# Patient Record
Sex: FEMALE | Race: WHITE | HISPANIC OR LATINO
[De-identification: names, ages, dates, MRNs, and addresses within clinical notes are randomized per-mention and may not be internally consistent; named-entity substitution may affect disease eponyms.]

---

## 2017-01-30 ENCOUNTER — APPOINTMENT (OUTPATIENT)
Dept: SURGICAL ONCOLOGY | Facility: CLINIC | Age: 72
End: 2017-01-30

## 2017-02-06 ENCOUNTER — LABORATORY RESULT (OUTPATIENT)
Age: 72
End: 2017-02-06

## 2017-02-06 ENCOUNTER — APPOINTMENT (OUTPATIENT)
Dept: RHEUMATOLOGY | Facility: CLINIC | Age: 72
End: 2017-02-06

## 2017-02-06 VITALS
HEIGHT: 62 IN | SYSTOLIC BLOOD PRESSURE: 116 MMHG | OXYGEN SATURATION: 98 % | TEMPERATURE: 98.5 F | BODY MASS INDEX: 22.45 KG/M2 | HEART RATE: 60 BPM | DIASTOLIC BLOOD PRESSURE: 76 MMHG | WEIGHT: 122 LBS

## 2017-02-06 DIAGNOSIS — E55.9 VITAMIN D DEFICIENCY, UNSPECIFIED: ICD-10-CM

## 2017-02-07 LAB
25(OH)D3 SERPL-MCNC: 23.6 NG/ML
ALBUMIN SERPL ELPH-MCNC: 4.5 G/DL
ALP BLD-CCNC: 71 U/L
ALT SERPL-CCNC: 15 U/L
ANION GAP SERPL CALC-SCNC: 12 MMOL/L
AST SERPL-CCNC: 16 U/L
BASOPHILS # BLD AUTO: 0.02 K/UL
BASOPHILS NFR BLD AUTO: 0.3 %
BILIRUB SERPL-MCNC: 0.8 MG/DL
BUN SERPL-MCNC: 11 MG/DL
CALCIUM SERPL-MCNC: 9.2 MG/DL
CALCIUM SERPL-MCNC: 9.2 MG/DL
CHLORIDE SERPL-SCNC: 103 MMOL/L
CO2 SERPL-SCNC: 25 MMOL/L
CREAT SERPL-MCNC: 0.77 MG/DL
CRP SERPL-MCNC: 0.33 MG/DL
EOSINOPHIL # BLD AUTO: 0.23 K/UL
EOSINOPHIL NFR BLD AUTO: 2.9 %
ERYTHROCYTE [SEDIMENTATION RATE] IN BLOOD BY WESTERGREN METHOD: 4 MM/HR
GLUCOSE SERPL-MCNC: 91 MG/DL
HCT VFR BLD CALC: 41.7 %
HGB BLD-MCNC: 12.9 G/DL
IMM GRANULOCYTES NFR BLD AUTO: 0.3 %
LYMPHOCYTES # BLD AUTO: 3.01 K/UL
LYMPHOCYTES NFR BLD AUTO: 37.7 %
MAGNESIUM SERPL-MCNC: 2.2 MG/DL
MAN DIFF?: NORMAL
MCHC RBC-ENTMCNC: 26.3 PG
MCHC RBC-ENTMCNC: 30.9 GM/DL
MCV RBC AUTO: 85.1 FL
MONOCYTES # BLD AUTO: 0.47 K/UL
MONOCYTES NFR BLD AUTO: 5.9 %
NEUTROPHILS # BLD AUTO: 4.24 K/UL
NEUTROPHILS NFR BLD AUTO: 52.9 %
PARATHYROID HORMONE INTACT: 54 PG/ML
PHOSPHATE SERPL-MCNC: 3.1 MG/DL
PLATELET # BLD AUTO: 277 K/UL
POTASSIUM SERPL-SCNC: 4.6 MMOL/L
PROT SERPL-MCNC: 6.9 G/DL
RBC # BLD: 4.9 M/UL
RBC # FLD: 14.9 %
SODIUM SERPL-SCNC: 140 MMOL/L
TSH SERPL-ACNC: 4.95 UIU/ML
WBC # FLD AUTO: 7.99 K/UL

## 2017-02-14 ENCOUNTER — APPOINTMENT (OUTPATIENT)
Dept: DERMATOLOGY | Facility: CLINIC | Age: 72
End: 2017-02-14

## 2017-02-14 VITALS — SYSTOLIC BLOOD PRESSURE: 138 MMHG | DIASTOLIC BLOOD PRESSURE: 78 MMHG

## 2017-02-14 DIAGNOSIS — L82.1 OTHER SEBORRHEIC KERATOSIS: ICD-10-CM

## 2017-02-14 DIAGNOSIS — L30.9 DERMATITIS, UNSPECIFIED: ICD-10-CM

## 2017-02-27 ENCOUNTER — APPOINTMENT (OUTPATIENT)
Dept: RHEUMATOLOGY | Facility: CLINIC | Age: 72
End: 2017-02-27

## 2017-02-27 ENCOUNTER — LABORATORY RESULT (OUTPATIENT)
Age: 72
End: 2017-02-27

## 2017-02-27 VITALS
SYSTOLIC BLOOD PRESSURE: 155 MMHG | BODY MASS INDEX: 23 KG/M2 | TEMPERATURE: 98.2 F | WEIGHT: 125 LBS | HEIGHT: 62 IN | DIASTOLIC BLOOD PRESSURE: 78 MMHG | OXYGEN SATURATION: 98 %

## 2017-02-27 VITALS — HEART RATE: 65 BPM

## 2017-02-27 RX ORDER — DENOSUMAB 60 MG/ML
60 INJECTION SUBCUTANEOUS
Qty: 1 | Refills: 0 | Status: COMPLETED | OUTPATIENT
Start: 2017-02-27

## 2017-02-27 RX ADMIN — DENOSUMAB 0 MG/ML: 60 INJECTION SUBCUTANEOUS at 00:00

## 2017-02-28 LAB
T3FREE SERPL-MCNC: 2.14 PG/ML
TSH SERPL-ACNC: 6.06 UIU/ML

## 2017-03-01 ENCOUNTER — OTHER (OUTPATIENT)
Age: 72
End: 2017-03-01

## 2017-03-01 ENCOUNTER — APPOINTMENT (OUTPATIENT)
Dept: ORTHOPEDIC SURGERY | Facility: CLINIC | Age: 72
End: 2017-03-01

## 2017-03-01 VITALS
HEART RATE: 52 BPM | SYSTOLIC BLOOD PRESSURE: 144 MMHG | HEIGHT: 61 IN | DIASTOLIC BLOOD PRESSURE: 79 MMHG | WEIGHT: 119 LBS | BODY MASS INDEX: 22.47 KG/M2

## 2017-03-22 ENCOUNTER — APPOINTMENT (OUTPATIENT)
Dept: SURGICAL ONCOLOGY | Facility: CLINIC | Age: 72
End: 2017-03-22

## 2017-03-22 VITALS
WEIGHT: 120 LBS | SYSTOLIC BLOOD PRESSURE: 153 MMHG | HEART RATE: 63 BPM | HEIGHT: 61 IN | BODY MASS INDEX: 22.66 KG/M2 | DIASTOLIC BLOOD PRESSURE: 81 MMHG | RESPIRATION RATE: 14 BRPM

## 2017-03-22 DIAGNOSIS — Z97.3 PRESENCE OF SPECTACLES AND CONTACT LENSES: ICD-10-CM

## 2017-03-27 ENCOUNTER — INPATIENT (INPATIENT)
Facility: HOSPITAL | Age: 72
LOS: 1 days | Discharge: HOME | End: 2017-03-29
Attending: SPECIALIST | Admitting: SPECIALIST
Payer: MEDICARE

## 2017-03-27 VITALS
DIASTOLIC BLOOD PRESSURE: 76 MMHG | OXYGEN SATURATION: 100 % | SYSTOLIC BLOOD PRESSURE: 160 MMHG | TEMPERATURE: 98 F | HEART RATE: 81 BPM | RESPIRATION RATE: 16 BRPM

## 2017-03-27 DIAGNOSIS — K55.9 VASCULAR DISORDER OF INTESTINE, UNSPECIFIED: ICD-10-CM

## 2017-03-27 DIAGNOSIS — Z90.3 ACQUIRED ABSENCE OF STOMACH [PART OF]: Chronic | ICD-10-CM

## 2017-03-27 DIAGNOSIS — Z98.89 OTHER SPECIFIED POSTPROCEDURAL STATES: Chronic | ICD-10-CM

## 2017-03-27 LAB
ALBUMIN SERPL ELPH-MCNC: 3.6 G/DL — SIGNIFICANT CHANGE UP (ref 3.3–5)
ALBUMIN SERPL ELPH-MCNC: 4.1 G/DL — SIGNIFICANT CHANGE UP (ref 3.3–5)
ALP SERPL-CCNC: 78 U/L — SIGNIFICANT CHANGE UP (ref 40–120)
ALP SERPL-CCNC: 91 U/L — SIGNIFICANT CHANGE UP (ref 40–120)
ALT FLD-CCNC: 12 U/L — SIGNIFICANT CHANGE UP (ref 4–33)
ALT FLD-CCNC: 16 U/L — SIGNIFICANT CHANGE UP (ref 4–33)
APTT BLD: 36.1 SEC — SIGNIFICANT CHANGE UP (ref 27.5–37.4)
AST SERPL-CCNC: 14 U/L — SIGNIFICANT CHANGE UP (ref 4–32)
AST SERPL-CCNC: 27 U/L — SIGNIFICANT CHANGE UP (ref 4–32)
BASE EXCESS BLDV CALC-SCNC: -0.2 MMOL/L — SIGNIFICANT CHANGE UP
BASOPHILS # BLD AUTO: 0.02 K/UL — SIGNIFICANT CHANGE UP (ref 0–0.2)
BASOPHILS NFR BLD AUTO: 0.2 % — SIGNIFICANT CHANGE UP (ref 0–2)
BILIRUB SERPL-MCNC: 0.8 MG/DL — SIGNIFICANT CHANGE UP (ref 0.2–1.2)
BILIRUB SERPL-MCNC: 1.2 MG/DL — SIGNIFICANT CHANGE UP (ref 0.2–1.2)
BLD GP AB SCN SERPL QL: NEGATIVE — SIGNIFICANT CHANGE UP
BLOOD GAS VENOUS - CREATININE: 0.61 MG/DL — SIGNIFICANT CHANGE UP (ref 0.5–1.3)
BUN SERPL-MCNC: 4 MG/DL — LOW (ref 7–23)
BUN SERPL-MCNC: 7 MG/DL — SIGNIFICANT CHANGE UP (ref 7–23)
CALCIUM SERPL-MCNC: 8.5 MG/DL — SIGNIFICANT CHANGE UP (ref 8.4–10.5)
CALCIUM SERPL-MCNC: 8.9 MG/DL — SIGNIFICANT CHANGE UP (ref 8.4–10.5)
CHLORIDE BLDV-SCNC: 109 MMOL/L — HIGH (ref 96–108)
CHLORIDE SERPL-SCNC: 100 MMOL/L — SIGNIFICANT CHANGE UP (ref 98–107)
CHLORIDE SERPL-SCNC: 110 MMOL/L — HIGH (ref 98–107)
CO2 SERPL-SCNC: 18 MMOL/L — LOW (ref 22–31)
CO2 SERPL-SCNC: 23 MMOL/L — SIGNIFICANT CHANGE UP (ref 22–31)
CREAT SERPL-MCNC: 0.6 MG/DL — SIGNIFICANT CHANGE UP (ref 0.5–1.3)
CREAT SERPL-MCNC: 0.66 MG/DL — SIGNIFICANT CHANGE UP (ref 0.5–1.3)
EOSINOPHIL # BLD AUTO: 0.05 K/UL — SIGNIFICANT CHANGE UP (ref 0–0.5)
EOSINOPHIL NFR BLD AUTO: 0.4 % — SIGNIFICANT CHANGE UP (ref 0–6)
GAS PNL BLDV: 136 MMOL/L — SIGNIFICANT CHANGE UP (ref 136–146)
GLUCOSE BLDV-MCNC: 95 — SIGNIFICANT CHANGE UP (ref 70–99)
GLUCOSE SERPL-MCNC: 110 MG/DL — HIGH (ref 70–99)
GLUCOSE SERPL-MCNC: 97 MG/DL — SIGNIFICANT CHANGE UP (ref 70–99)
HCO3 BLDV-SCNC: 23 MMOL/L — SIGNIFICANT CHANGE UP (ref 20–27)
HCT VFR BLD CALC: 34.2 % — LOW (ref 34.5–45)
HCT VFR BLD CALC: 38.2 % — SIGNIFICANT CHANGE UP (ref 34.5–45)
HCT VFR BLDV CALC: 35.3 % — SIGNIFICANT CHANGE UP (ref 34.5–45)
HGB BLD-MCNC: 11.5 G/DL — SIGNIFICANT CHANGE UP (ref 11.5–15.5)
HGB BLD-MCNC: 12.4 G/DL — SIGNIFICANT CHANGE UP (ref 11.5–15.5)
HGB BLDV-MCNC: 11.5 G/DL — SIGNIFICANT CHANGE UP (ref 11.5–15.5)
IMM GRANULOCYTES NFR BLD AUTO: 0.3 % — SIGNIFICANT CHANGE UP (ref 0–1.5)
INR BLD: 1.03 — SIGNIFICANT CHANGE UP (ref 0.88–1.17)
LACTATE BLDV-MCNC: 2.3 MMOL/L — HIGH (ref 0.5–2)
LACTATE SERPL-SCNC: 1.3 MMOL/L — SIGNIFICANT CHANGE UP (ref 0.5–2)
LIDOCAIN IGE QN: 31.2 U/L — SIGNIFICANT CHANGE UP (ref 7–60)
LYMPHOCYTES # BLD AUTO: 1.87 K/UL — SIGNIFICANT CHANGE UP (ref 1–3.3)
LYMPHOCYTES # BLD AUTO: 16.5 % — SIGNIFICANT CHANGE UP (ref 13–44)
MCHC RBC-ENTMCNC: 26.5 PG — LOW (ref 27–34)
MCHC RBC-ENTMCNC: 27.3 PG — SIGNIFICANT CHANGE UP (ref 27–34)
MCHC RBC-ENTMCNC: 32.5 % — SIGNIFICANT CHANGE UP (ref 32–36)
MCHC RBC-ENTMCNC: 33.6 % — SIGNIFICANT CHANGE UP (ref 32–36)
MCV RBC AUTO: 81.2 FL — SIGNIFICANT CHANGE UP (ref 80–100)
MCV RBC AUTO: 81.6 FL — SIGNIFICANT CHANGE UP (ref 80–100)
MONOCYTES # BLD AUTO: 0.57 K/UL — SIGNIFICANT CHANGE UP (ref 0–0.9)
MONOCYTES NFR BLD AUTO: 5 % — SIGNIFICANT CHANGE UP (ref 2–14)
NEUTROPHILS # BLD AUTO: 8.78 K/UL — HIGH (ref 1.8–7.4)
NEUTROPHILS NFR BLD AUTO: 77.6 % — HIGH (ref 43–77)
PCO2 BLDV: 43 MMHG — SIGNIFICANT CHANGE UP (ref 41–51)
PH BLDV: 7.37 PH — SIGNIFICANT CHANGE UP (ref 7.32–7.43)
PLATELET # BLD AUTO: 231 K/UL — SIGNIFICANT CHANGE UP (ref 150–400)
PLATELET # BLD AUTO: 246 K/UL — SIGNIFICANT CHANGE UP (ref 150–400)
PMV BLD: 10.6 FL — SIGNIFICANT CHANGE UP (ref 7–13)
PMV BLD: 10.8 FL — SIGNIFICANT CHANGE UP (ref 7–13)
PO2 BLDV: 28 MMHG — LOW (ref 35–40)
POTASSIUM BLDV-SCNC: 4.1 MMOL/L — SIGNIFICANT CHANGE UP (ref 3.4–4.5)
POTASSIUM SERPL-MCNC: 3.8 MMOL/L — SIGNIFICANT CHANGE UP (ref 3.5–5.3)
POTASSIUM SERPL-MCNC: 4.6 MMOL/L — SIGNIFICANT CHANGE UP (ref 3.5–5.3)
POTASSIUM SERPL-SCNC: 3.8 MMOL/L — SIGNIFICANT CHANGE UP (ref 3.5–5.3)
POTASSIUM SERPL-SCNC: 4.6 MMOL/L — SIGNIFICANT CHANGE UP (ref 3.5–5.3)
PROT SERPL-MCNC: 6 G/DL — SIGNIFICANT CHANGE UP (ref 6–8.3)
PROT SERPL-MCNC: 7 G/DL — SIGNIFICANT CHANGE UP (ref 6–8.3)
PROTHROM AB SERPL-ACNC: 11.6 SEC — SIGNIFICANT CHANGE UP (ref 9.8–13.1)
RBC # BLD: 4.21 M/UL — SIGNIFICANT CHANGE UP (ref 3.8–5.2)
RBC # BLD: 4.68 M/UL — SIGNIFICANT CHANGE UP (ref 3.8–5.2)
RBC # FLD: 14.3 % — SIGNIFICANT CHANGE UP (ref 10.3–14.5)
RBC # FLD: 14.3 % — SIGNIFICANT CHANGE UP (ref 10.3–14.5)
RH IG SCN BLD-IMP: NEGATIVE — SIGNIFICANT CHANGE UP
RH IG SCN BLD-IMP: NEGATIVE — SIGNIFICANT CHANGE UP
SAO2 % BLDV: 53 % — LOW (ref 60–85)
SODIUM SERPL-SCNC: 139 MMOL/L — SIGNIFICANT CHANGE UP (ref 135–145)
SODIUM SERPL-SCNC: 147 MMOL/L — HIGH (ref 135–145)
WBC # BLD: 10.19 K/UL — SIGNIFICANT CHANGE UP (ref 3.8–10.5)
WBC # BLD: 11.32 K/UL — HIGH (ref 3.8–10.5)
WBC # FLD AUTO: 10.19 K/UL — SIGNIFICANT CHANGE UP (ref 3.8–10.5)
WBC # FLD AUTO: 11.32 K/UL — HIGH (ref 3.8–10.5)

## 2017-03-27 PROCEDURE — 74177 CT ABD & PELVIS W/CONTRAST: CPT | Mod: 26

## 2017-03-27 RX ORDER — CIPROFLOXACIN LACTATE 400MG/40ML
400 VIAL (ML) INTRAVENOUS ONCE
Qty: 0 | Refills: 0 | Status: COMPLETED | OUTPATIENT
Start: 2017-03-27 | End: 2017-03-27

## 2017-03-27 RX ORDER — SODIUM CHLORIDE 9 MG/ML
1000 INJECTION, SOLUTION INTRAVENOUS ONCE
Qty: 0 | Refills: 0 | Status: COMPLETED | OUTPATIENT
Start: 2017-03-27 | End: 2017-03-27

## 2017-03-27 RX ORDER — ONDANSETRON 8 MG/1
4 TABLET, FILM COATED ORAL ONCE
Qty: 0 | Refills: 0 | Status: COMPLETED | OUTPATIENT
Start: 2017-03-27 | End: 2017-03-27

## 2017-03-27 RX ORDER — METRONIDAZOLE 500 MG
500 TABLET ORAL ONCE
Qty: 0 | Refills: 0 | Status: COMPLETED | OUTPATIENT
Start: 2017-03-27 | End: 2017-03-27

## 2017-03-27 RX ORDER — ENOXAPARIN SODIUM 100 MG/ML
40 INJECTION SUBCUTANEOUS EVERY 24 HOURS
Qty: 0 | Refills: 0 | Status: DISCONTINUED | OUTPATIENT
Start: 2017-03-27 | End: 2017-03-29

## 2017-03-27 RX ORDER — SODIUM CHLORIDE 9 MG/ML
3 INJECTION INTRAMUSCULAR; INTRAVENOUS; SUBCUTANEOUS
Qty: 0 | Refills: 0 | Status: COMPLETED | OUTPATIENT
Start: 2017-03-27 | End: 2017-03-27

## 2017-03-27 RX ORDER — SODIUM CHLORIDE 9 MG/ML
1000 INJECTION, SOLUTION INTRAVENOUS
Qty: 0 | Refills: 0 | Status: DISCONTINUED | OUTPATIENT
Start: 2017-03-27 | End: 2017-03-28

## 2017-03-27 RX ORDER — PANTOPRAZOLE SODIUM 20 MG/1
40 TABLET, DELAYED RELEASE ORAL DAILY
Qty: 0 | Refills: 0 | Status: DISCONTINUED | OUTPATIENT
Start: 2017-03-27 | End: 2017-03-29

## 2017-03-27 RX ORDER — CIPROFLOXACIN LACTATE 400MG/40ML
400 VIAL (ML) INTRAVENOUS EVERY 12 HOURS
Qty: 0 | Refills: 0 | Status: DISCONTINUED | OUTPATIENT
Start: 2017-03-27 | End: 2017-03-29

## 2017-03-27 RX ORDER — METRONIDAZOLE 500 MG
TABLET ORAL
Qty: 0 | Refills: 0 | Status: DISCONTINUED | OUTPATIENT
Start: 2017-03-27 | End: 2017-03-29

## 2017-03-27 RX ORDER — METOPROLOL TARTRATE 50 MG
5 TABLET ORAL EVERY 6 HOURS
Qty: 0 | Refills: 0 | Status: DISCONTINUED | OUTPATIENT
Start: 2017-03-27 | End: 2017-03-28

## 2017-03-27 RX ORDER — METRONIDAZOLE 500 MG
500 TABLET ORAL EVERY 8 HOURS
Qty: 0 | Refills: 0 | Status: DISCONTINUED | OUTPATIENT
Start: 2017-03-27 | End: 2017-03-29

## 2017-03-27 RX ORDER — SODIUM CHLORIDE 9 MG/ML
1000 INJECTION INTRAMUSCULAR; INTRAVENOUS; SUBCUTANEOUS ONCE
Qty: 0 | Refills: 0 | Status: COMPLETED | OUTPATIENT
Start: 2017-03-27 | End: 2017-03-27

## 2017-03-27 RX ORDER — CIPROFLOXACIN LACTATE 400MG/40ML
VIAL (ML) INTRAVENOUS
Qty: 0 | Refills: 0 | Status: DISCONTINUED | OUTPATIENT
Start: 2017-03-27 | End: 2017-03-29

## 2017-03-27 RX ADMIN — Medication 5 MILLIGRAM(S): at 12:35

## 2017-03-27 RX ADMIN — SODIUM CHLORIDE 1000 MILLILITER(S): 9 INJECTION, SOLUTION INTRAVENOUS at 12:00

## 2017-03-27 RX ADMIN — SODIUM CHLORIDE 150 MILLILITER(S): 9 INJECTION, SOLUTION INTRAVENOUS at 23:16

## 2017-03-27 RX ADMIN — SODIUM CHLORIDE 3 MILLILITER(S): 9 INJECTION INTRAMUSCULAR; INTRAVENOUS; SUBCUTANEOUS at 07:08

## 2017-03-27 RX ADMIN — Medication 5 MILLIGRAM(S): at 23:13

## 2017-03-27 RX ADMIN — ENOXAPARIN SODIUM 40 MILLIGRAM(S): 100 INJECTION SUBCUTANEOUS at 12:36

## 2017-03-27 RX ADMIN — Medication 100 MILLIGRAM(S): at 13:03

## 2017-03-27 RX ADMIN — ONDANSETRON 4 MILLIGRAM(S): 8 TABLET, FILM COATED ORAL at 07:08

## 2017-03-27 RX ADMIN — SODIUM CHLORIDE 1000 MILLILITER(S): 9 INJECTION INTRAMUSCULAR; INTRAVENOUS; SUBCUTANEOUS at 07:09

## 2017-03-27 RX ADMIN — Medication 200 MILLIGRAM(S): at 12:08

## 2017-03-27 RX ADMIN — PANTOPRAZOLE SODIUM 40 MILLIGRAM(S): 20 TABLET, DELAYED RELEASE ORAL at 12:36

## 2017-03-27 RX ADMIN — SODIUM CHLORIDE 150 MILLILITER(S): 9 INJECTION, SOLUTION INTRAVENOUS at 13:31

## 2017-03-27 RX ADMIN — Medication 100 MILLIGRAM(S): at 22:39

## 2017-03-27 NOTE — ED ADULT NURSE NOTE - CAS EDN DISCHARGE ASSESSMENT
No adverse reaction to first time med in ED/Awake/Symptoms improved/Patient baseline mental status/Alert and oriented to person, place and time

## 2017-03-27 NOTE — H&P ADULT. - ASSESSMENT
72yo F with ischemic colitis of descending colon, likely low flow.   - NPO  - IVF hydration  - Cipro/flagyl  - serial abdominal exams  - D/W Dr. Mcclelland

## 2017-03-27 NOTE — ED ADULT NURSE NOTE - OBJECTIVE STATEMENT
HTN, GIST tumor s/p partial gastrectomy, hx of diverticulitis p/w 4 days of 4/10 dull, aching left lower quadrant abdominal pain, non-radiating, worse after eating and with palpation, associated w/ diarrhea and nausea. Had similar pain in the past w/ diverticulitis. Noted some red blood in stool last night. No melena. No upper abdominal pain. calm, cooperative, ambulatory, son at bedside.

## 2017-03-27 NOTE — ED PROVIDER NOTE - OBJECTIVE STATEMENT
71yof w/ HTN, GIST tumor s/p partial gastrectomy, hx of diverticulitis p/w 4 days of 4/10 dull, aching left lower quadrant abdominal pain, non-radiating, worse after eating and with palpation, associated w/ diarrhea and nausea. Had similar pain in the past w/ diverticulitis. Noted some red blood in stool last night. No melena. No upper abdominal pain. Chills, no fevers. No dysuria, vaginal bleeding, pelvic pain.

## 2017-03-27 NOTE — ED ADULT NURSE REASSESSMENT NOTE - NS ED NURSE REASSESS COMMENT FT1
floor RN on [id number] is in shift report, would not take ED report.  report given to ED incoming night nurse

## 2017-03-27 NOTE — ED ADULT TRIAGE NOTE - CHIEF COMPLAINT QUOTE
Patient c/o lower left sided abdominal pain since last Thursday. Last night the pain was so bad she almost passed out, had diarrhea and vomiting, small amount of blood in her stool.  H/O diverticulitis , diagnosed last year.

## 2017-03-27 NOTE — ED PROVIDER NOTE - PROGRESS NOTE DETAILS
MANI Orta - pt signed out to me by Dr aHthaway at the shift change, abd pain, hx of diverticulitis, CT abd/pelvis pending. Pt non-toxic appearing, afebrile, doesn't require pain meds at the moment. will continue to monitor.

## 2017-03-27 NOTE — ED PROVIDER NOTE - MEDICAL DECISION MAKING DETAILS
71yof w/ LLQ pain, diarrhea, consistent w/ diverticulitis, however given hx of GIST tumor and advanced age will CT A/P to r/o other causes of abdominal pain. Check basic labs, fluids, analgesia.

## 2017-03-27 NOTE — ED PROVIDER NOTE - ATTENDING CONTRIBUTION TO CARE
pt with LLQ abd pain.  Reports a history of divertic in the past which turned out to be gastric CA.  As such with a tender abd will get CT scan.  Concern for divertic, complication of surgery, or other abd pathology

## 2017-03-28 LAB
APPEARANCE UR: CLEAR — SIGNIFICANT CHANGE UP
BACTERIA # UR AUTO: SIGNIFICANT CHANGE UP
BILIRUB UR-MCNC: NEGATIVE — SIGNIFICANT CHANGE UP
BLOOD UR QL VISUAL: HIGH
BUN SERPL-MCNC: 3 MG/DL — LOW (ref 7–23)
CALCIUM SERPL-MCNC: 8.7 MG/DL — SIGNIFICANT CHANGE UP (ref 8.4–10.5)
CHLORIDE SERPL-SCNC: 106 MMOL/L — SIGNIFICANT CHANGE UP (ref 98–107)
CO2 SERPL-SCNC: 25 MMOL/L — SIGNIFICANT CHANGE UP (ref 22–31)
COLOR SPEC: COLORLESS — SIGNIFICANT CHANGE UP
CREAT SERPL-MCNC: 0.6 MG/DL — SIGNIFICANT CHANGE UP (ref 0.5–1.3)
GLUCOSE SERPL-MCNC: 104 MG/DL — HIGH (ref 70–99)
GLUCOSE UR-MCNC: NEGATIVE — SIGNIFICANT CHANGE UP
HCT VFR BLD CALC: 34 % — LOW (ref 34.5–45)
HGB BLD-MCNC: 11.2 G/DL — LOW (ref 11.5–15.5)
HYALINE CASTS # UR AUTO: SIGNIFICANT CHANGE UP (ref 0–?)
KETONES UR-MCNC: SIGNIFICANT CHANGE UP
LEUKOCYTE ESTERASE UR-ACNC: HIGH
MCHC RBC-ENTMCNC: 26.5 PG — LOW (ref 27–34)
MCHC RBC-ENTMCNC: 32.9 % — SIGNIFICANT CHANGE UP (ref 32–36)
MCV RBC AUTO: 80.4 FL — SIGNIFICANT CHANGE UP (ref 80–100)
MUCOUS THREADS # UR AUTO: SIGNIFICANT CHANGE UP
NITRITE UR-MCNC: NEGATIVE — SIGNIFICANT CHANGE UP
PH UR: 7.5 — SIGNIFICANT CHANGE UP (ref 4.6–8)
PLATELET # BLD AUTO: 236 K/UL — SIGNIFICANT CHANGE UP (ref 150–400)
PMV BLD: 10.5 FL — SIGNIFICANT CHANGE UP (ref 7–13)
POTASSIUM SERPL-MCNC: 3.9 MMOL/L — SIGNIFICANT CHANGE UP (ref 3.5–5.3)
POTASSIUM SERPL-SCNC: 3.9 MMOL/L — SIGNIFICANT CHANGE UP (ref 3.5–5.3)
PROT UR-MCNC: NEGATIVE — SIGNIFICANT CHANGE UP
RBC # BLD: 4.23 M/UL — SIGNIFICANT CHANGE UP (ref 3.8–5.2)
RBC # FLD: 14.3 % — SIGNIFICANT CHANGE UP (ref 10.3–14.5)
RBC CASTS # UR COMP ASSIST: SIGNIFICANT CHANGE UP (ref 0–?)
SODIUM SERPL-SCNC: 143 MMOL/L — SIGNIFICANT CHANGE UP (ref 135–145)
SP GR SPEC: 1.01 — SIGNIFICANT CHANGE UP (ref 1–1.03)
SQUAMOUS # UR AUTO: SIGNIFICANT CHANGE UP
UROBILINOGEN FLD QL: NORMAL E.U. — SIGNIFICANT CHANGE UP (ref 0.1–0.2)
WBC # BLD: 9.69 K/UL — SIGNIFICANT CHANGE UP (ref 3.8–10.5)
WBC # FLD AUTO: 9.69 K/UL — SIGNIFICANT CHANGE UP (ref 3.8–10.5)
WBC UR QL: SIGNIFICANT CHANGE UP (ref 0–?)

## 2017-03-28 PROCEDURE — 99232 SBSQ HOSP IP/OBS MODERATE 35: CPT

## 2017-03-28 RX ORDER — ACETAMINOPHEN 500 MG
650 TABLET ORAL ONCE
Qty: 0 | Refills: 0 | Status: COMPLETED | OUTPATIENT
Start: 2017-03-28 | End: 2017-03-28

## 2017-03-28 RX ORDER — DEXTROSE MONOHYDRATE, SODIUM CHLORIDE, AND POTASSIUM CHLORIDE 50; .745; 4.5 G/1000ML; G/1000ML; G/1000ML
1000 INJECTION, SOLUTION INTRAVENOUS
Qty: 0 | Refills: 0 | Status: DISCONTINUED | OUTPATIENT
Start: 2017-03-28 | End: 2017-03-29

## 2017-03-28 RX ORDER — LISINOPRIL 2.5 MG/1
10 TABLET ORAL DAILY
Qty: 0 | Refills: 0 | Status: DISCONTINUED | OUTPATIENT
Start: 2017-03-28 | End: 2017-03-29

## 2017-03-28 RX ORDER — METOPROLOL TARTRATE 50 MG
25 TABLET ORAL
Qty: 0 | Refills: 0 | Status: DISCONTINUED | OUTPATIENT
Start: 2017-03-28 | End: 2017-03-29

## 2017-03-28 RX ADMIN — SODIUM CHLORIDE 50 MILLILITER(S): 9 INJECTION, SOLUTION INTRAVENOUS at 10:45

## 2017-03-28 RX ADMIN — SODIUM CHLORIDE 150 MILLILITER(S): 9 INJECTION, SOLUTION INTRAVENOUS at 08:22

## 2017-03-28 RX ADMIN — Medication 100 MILLIGRAM(S): at 13:30

## 2017-03-28 RX ADMIN — Medication 100 MILLIGRAM(S): at 21:55

## 2017-03-28 RX ADMIN — Medication 5 MILLIGRAM(S): at 05:54

## 2017-03-28 RX ADMIN — Medication 650 MILLIGRAM(S): at 10:29

## 2017-03-28 RX ADMIN — Medication 200 MILLIGRAM(S): at 07:23

## 2017-03-28 RX ADMIN — Medication 5 MILLIGRAM(S): at 11:32

## 2017-03-28 RX ADMIN — Medication 650 MILLIGRAM(S): at 09:55

## 2017-03-28 RX ADMIN — ENOXAPARIN SODIUM 40 MILLIGRAM(S): 100 INJECTION SUBCUTANEOUS at 11:21

## 2017-03-28 RX ADMIN — Medication 650 MILLIGRAM(S): at 18:54

## 2017-03-28 RX ADMIN — Medication 200 MILLIGRAM(S): at 17:10

## 2017-03-28 RX ADMIN — DEXTROSE MONOHYDRATE, SODIUM CHLORIDE, AND POTASSIUM CHLORIDE 50 MILLILITER(S): 50; .745; 4.5 INJECTION, SOLUTION INTRAVENOUS at 19:52

## 2017-03-28 RX ADMIN — Medication 100 MILLIGRAM(S): at 05:54

## 2017-03-28 RX ADMIN — Medication 650 MILLIGRAM(S): at 18:16

## 2017-03-29 ENCOUNTER — TRANSCRIPTION ENCOUNTER (OUTPATIENT)
Age: 72
End: 2017-03-29

## 2017-03-29 VITALS
HEART RATE: 60 BPM | OXYGEN SATURATION: 100 % | RESPIRATION RATE: 17 BRPM | SYSTOLIC BLOOD PRESSURE: 132 MMHG | TEMPERATURE: 98 F | DIASTOLIC BLOOD PRESSURE: 66 MMHG

## 2017-03-29 LAB
BUN SERPL-MCNC: 3 MG/DL — LOW (ref 7–23)
CALCIUM SERPL-MCNC: 8.8 MG/DL — SIGNIFICANT CHANGE UP (ref 8.4–10.5)
CHLORIDE SERPL-SCNC: 105 MMOL/L — SIGNIFICANT CHANGE UP (ref 98–107)
CO2 SERPL-SCNC: 23 MMOL/L — SIGNIFICANT CHANGE UP (ref 22–31)
CREAT SERPL-MCNC: 0.65 MG/DL — SIGNIFICANT CHANGE UP (ref 0.5–1.3)
GLUCOSE SERPL-MCNC: 103 MG/DL — HIGH (ref 70–99)
MAGNESIUM SERPL-MCNC: 1.9 MG/DL — SIGNIFICANT CHANGE UP (ref 1.6–2.6)
PHOSPHATE SERPL-MCNC: 2.5 MG/DL — SIGNIFICANT CHANGE UP (ref 2.5–4.5)
POTASSIUM SERPL-MCNC: 3.7 MMOL/L — SIGNIFICANT CHANGE UP (ref 3.5–5.3)
POTASSIUM SERPL-SCNC: 3.7 MMOL/L — SIGNIFICANT CHANGE UP (ref 3.5–5.3)
SODIUM SERPL-SCNC: 142 MMOL/L — SIGNIFICANT CHANGE UP (ref 135–145)

## 2017-03-29 PROCEDURE — 99238 HOSP IP/OBS DSCHRG MGMT 30/<: CPT

## 2017-03-29 RX ORDER — METRONIDAZOLE 500 MG
1 TABLET ORAL
Qty: 21 | Refills: 0
Start: 2017-03-29 | End: 2017-04-05

## 2017-03-29 RX ORDER — CIPROFLOXACIN LACTATE 400MG/40ML
1 VIAL (ML) INTRAVENOUS
Qty: 14 | Refills: 0
Start: 2017-03-29 | End: 2017-04-05

## 2017-03-29 RX ORDER — MAGNESIUM SULFATE 500 MG/ML
1 VIAL (ML) INJECTION ONCE
Qty: 0 | Refills: 0 | Status: COMPLETED | OUTPATIENT
Start: 2017-03-29 | End: 2017-03-29

## 2017-03-29 RX ORDER — POTASSIUM CHLORIDE 20 MEQ
20 PACKET (EA) ORAL ONCE
Qty: 0 | Refills: 0 | Status: COMPLETED | OUTPATIENT
Start: 2017-03-29 | End: 2017-03-29

## 2017-03-29 RX ORDER — ASPIRIN/CALCIUM CARB/MAGNESIUM 324 MG
81 TABLET ORAL DAILY
Qty: 0 | Refills: 0 | Status: DISCONTINUED | OUTPATIENT
Start: 2017-03-29 | End: 2017-03-29

## 2017-03-29 RX ADMIN — Medication 100 GRAM(S): at 10:28

## 2017-03-29 RX ADMIN — DEXTROSE MONOHYDRATE, SODIUM CHLORIDE, AND POTASSIUM CHLORIDE 50 MILLILITER(S): 50; .745; 4.5 INJECTION, SOLUTION INTRAVENOUS at 06:24

## 2017-03-29 RX ADMIN — PANTOPRAZOLE SODIUM 40 MILLIGRAM(S): 20 TABLET, DELAYED RELEASE ORAL at 06:23

## 2017-03-29 RX ADMIN — ENOXAPARIN SODIUM 40 MILLIGRAM(S): 100 INJECTION SUBCUTANEOUS at 11:54

## 2017-03-29 RX ADMIN — Medication 200 MILLIGRAM(S): at 06:54

## 2017-03-29 RX ADMIN — Medication 20 MILLIEQUIVALENT(S): at 10:28

## 2017-03-29 RX ADMIN — Medication 100 MILLIGRAM(S): at 06:22

## 2017-03-29 RX ADMIN — Medication 25 MILLIGRAM(S): at 06:22

## 2017-03-29 RX ADMIN — Medication 81 MILLIGRAM(S): at 11:54

## 2017-03-29 RX ADMIN — LISINOPRIL 10 MILLIGRAM(S): 2.5 TABLET ORAL at 06:22

## 2017-03-29 NOTE — DISCHARGE NOTE ADULT - MEDICATION SUMMARY - MEDICATIONS TO TAKE
I will START or STAY ON the medications listed below when I get home from the hospital:    metroNIDAZOLE 500 mg oral tablet  -- 1 tab(s) by mouth every 8 hours  -- Indication: For colitis    aspirin 81 mg oral delayed release tablet  -- 1 tab(s) by mouth once a day last dose 5/30  -- Cardiologist told her to take it for "low heart rate"  -- Indication: For home medication    lisinopril 10 mg oral tablet  --  by mouth once a day (at bedtime)  -- Indication: For hypertension    metoprolol tartrate 25 mg oral tablet  -- 1 tab(s) by mouth every 12 hours  -- Indication: For hypertension    ciprofloxacin 500 mg oral tablet  -- 1 tab(s) by mouth 2 times a day  -- Indication: For colitis

## 2017-03-29 NOTE — DISCHARGE NOTE ADULT - SECONDARY DIAGNOSIS.
Gastritis GIST, non-malignant H/O varicose vein stripping HTN (hypertension) Ischemic bowel disease S/P partial gastrectomy

## 2017-03-29 NOTE — DISCHARGE NOTE ADULT - PLAN OF CARE
DIET: Return to your usual diet.  NOTIFY YOUR SURGEON IF: You have any bleeding that does not stop, any fever (over 100.4 F) or chills, persistent nausea/vomiting, persistent diarrhea.  FOLLOW-UP: Please follow up with your primary care physician in one week regarding your hospitalization   Follow up with your Gastroenterologist Dr. Vaughn in 1 week  Follow up with Dr. House if needed.  Your prescriptions for the antibiotics Cipro and Flagyl were sent to your [facility] pharmacy. bowel rest, antibiotics

## 2017-03-29 NOTE — DISCHARGE NOTE ADULT - CARE PROVIDERS DIRECT ADDRESSES
[email or web address]

## 2017-03-29 NOTE — DISCHARGE NOTE ADULT - CARE PROVIDER_API CALL
[facility]), Surgery  [address] [place] [address]  Phone: [phone]  Fax: [phone]

## 2017-03-29 NOTE — DISCHARGE NOTE ADULT - HOSPITAL COURSE
72yo F with PMH diverticulitis and GIST s/p partial gastrectomy about 1 year ago (Dr. Avila), presents to ER with abdominal pain. The pt first noted some discomfort about 4 days ago, but had not other symptoms at that time and the pain was mild. Yesterday, a few hours after eating some undercooked tortillas at a restaurant, she began having worsening abdominal pain and diarrhea. This morning the pain became very severe and the diarrhea was blood tinged (mostly water). She also vomited twice yesterday.     Her  had some abdominal cramps after eating the same food, but did not have diarrhea or vomiting and is feeling better this morning.  CT A/P showed colitis.  There was a long segment descending colon with wall thickening. Status post partial gastrectomy. Colonic diverticulosis without evidence of diverticulitis. No bowel obstruction. Appendix is normal.    Pt started on Cipro/Flagyl.  Pt's diarrhea improved and she no longer had blood in her stool.  Diet was advanced and she tolerated regular diet well.  Pt had a colonoscopy one year ago with Gastroenterologist Dr. Russ Vaughn ().  His office was called.  Pt discharged home in stable condition.

## 2017-03-29 NOTE — DISCHARGE NOTE ADULT - CARE PLAN
Principal Discharge DX:	Colitis  Goal:	bowel rest, antibiotics  Instructions for follow-up, activity and diet:	DIET: Return to your usual diet.  NOTIFY YOUR SURGEON IF: You have any bleeding that does not stop, any fever (over 100.4 F) or chills, persistent nausea/vomiting, persistent diarrhea.  FOLLOW-UP: Please follow up with your primary care physician in one week regarding your hospitalization   Follow up with your Gastroenterologist Dr. Vaughn in 1 week  Follow up with Dr. House if needed.  Your prescriptions for the antibiotics Cipro and Flagyl were sent to your [facility] pharmacy.  Secondary Diagnosis:	Gastritis  Secondary Diagnosis:	GIST, non-malignant  Secondary Diagnosis:	H/O varicose vein stripping  Secondary Diagnosis:	HTN (hypertension)  Secondary Diagnosis:	Ischemic bowel disease  Secondary Diagnosis:	S/P partial gastrectomy Principal Discharge DX:	Colitis  Goal:	bowel rest, antibiotics  Instructions for follow-up, activity and diet:	DIET: Return to your usual diet.  NOTIFY YOUR SURGEON IF: You have any bleeding that does not stop, any fever (over 100.4 F) or chills, persistent nausea/vomiting, persistent diarrhea.  FOLLOW-UP: Please follow up with your primary care physician in one week regarding your hospitalization   Follow up with your Gastroenterologist Dr. Vaughn in 1 week  Follow up with Dr. House if needed.  Your prescriptions for the antibiotics Cipro and Flagyl were sent to your [facility] pharmacy.  Secondary Diagnosis:	Gastritis  Secondary Diagnosis:	GIST, non-malignant  Secondary Diagnosis:	H/O varicose vein stripping  Secondary Diagnosis:	HTN (hypertension)  Secondary Diagnosis:	Ischemic bowel disease  Secondary Diagnosis:	S/P partial gastrectomy Principal Discharge DX:	Colitis  Goal:	bowel rest, antibiotics  Instructions for follow-up, activity and diet:	DIET: Return to your usual diet.  NOTIFY YOUR SURGEON IF: You have any bleeding that does not stop, any fever (over 100.4 F) or chills, persistent nausea/vomiting, persistent diarrhea.  FOLLOW-UP: Please follow up with your primary care physician in one week regarding your hospitalization   Follow up with your Gastroenterologist Dr. Vaughn in 1 week  Follow up with Dr. House if needed.  Your prescriptions for the antibiotics Cipro and Flagyl were sent to your [facility] pharmacy.  Secondary Diagnosis:	Gastritis  Secondary Diagnosis:	GIST, non-malignant  Secondary Diagnosis:	H/O varicose vein stripping  Secondary Diagnosis:	HTN (hypertension)  Secondary Diagnosis:	Ischemic bowel disease  Secondary Diagnosis:	S/P partial gastrectomy

## 2017-03-29 NOTE — DISCHARGE NOTE ADULT - PATIENT PORTAL LINK FT
“You can access the [facility] Patient Portal, offered by [facility], by registering with the following website: [email or web address]”

## 2017-04-01 ENCOUNTER — EMERGENCY (EMERGENCY)
Facility: HOSPITAL | Age: 72
LOS: 1 days | Discharge: HOME | End: 2017-04-01
Attending: EMERGENCY MEDICINE | Admitting: EMERGENCY MEDICINE
Payer: COMMERCIAL

## 2017-04-01 VITALS
SYSTOLIC BLOOD PRESSURE: 155 MMHG | TEMPERATURE: 98 F | WEIGHT: 119.93 LBS | RESPIRATION RATE: 18 BRPM | OXYGEN SATURATION: 97 % | HEIGHT: 60 IN | HEART RATE: 61 BPM | DIASTOLIC BLOOD PRESSURE: 75 MMHG

## 2017-04-01 VITALS
OXYGEN SATURATION: 100 % | RESPIRATION RATE: 17 BRPM | HEART RATE: 69 BPM | DIASTOLIC BLOOD PRESSURE: 69 MMHG | SYSTOLIC BLOOD PRESSURE: 153 MMHG

## 2017-04-01 DIAGNOSIS — Z79.82 LONG TERM (CURRENT) USE OF ASPIRIN: ICD-10-CM

## 2017-04-01 DIAGNOSIS — R42 DIZZINESS AND GIDDINESS: ICD-10-CM

## 2017-04-01 DIAGNOSIS — Z91.09 OTHER ALLERGY STATUS, OTHER THAN TO DRUGS AND BIOLOGICAL SUBSTANCES: ICD-10-CM

## 2017-04-01 DIAGNOSIS — Z98.89 OTHER SPECIFIED POSTPROCEDURAL STATES: Chronic | ICD-10-CM

## 2017-04-01 DIAGNOSIS — K52.9 NONINFECTIVE GASTROENTERITIS AND COLITIS, UNSPECIFIED: ICD-10-CM

## 2017-04-01 DIAGNOSIS — Z91.018 ALLERGY TO OTHER FOODS: ICD-10-CM

## 2017-04-01 DIAGNOSIS — Z90.49 ACQUIRED ABSENCE OF OTHER SPECIFIED PARTS OF DIGESTIVE TRACT: ICD-10-CM

## 2017-04-01 DIAGNOSIS — R00.2 PALPITATIONS: ICD-10-CM

## 2017-04-01 DIAGNOSIS — Z90.3 ACQUIRED ABSENCE OF STOMACH [PART OF]: Chronic | ICD-10-CM

## 2017-04-01 LAB
ALBUMIN SERPL ELPH-MCNC: 4.3 G/DL — SIGNIFICANT CHANGE UP (ref 3.3–5)
ALP SERPL-CCNC: 96 U/L — SIGNIFICANT CHANGE UP (ref 40–120)
ALT FLD-CCNC: 97 U/L RC — HIGH (ref 10–45)
ANION GAP SERPL CALC-SCNC: 16 MMOL/L — SIGNIFICANT CHANGE UP (ref 5–17)
APPEARANCE UR: CLEAR — SIGNIFICANT CHANGE UP
AST SERPL-CCNC: 100 U/L — HIGH (ref 10–40)
BACTERIA # UR AUTO: ABNORMAL /HPF
BASOPHILS # BLD AUTO: 0 K/UL — SIGNIFICANT CHANGE UP (ref 0–0.2)
BASOPHILS NFR BLD AUTO: 0.5 % — SIGNIFICANT CHANGE UP (ref 0–2)
BILIRUB SERPL-MCNC: 0.3 MG/DL — SIGNIFICANT CHANGE UP (ref 0.2–1.2)
BILIRUB UR-MCNC: NEGATIVE — SIGNIFICANT CHANGE UP
BUN SERPL-MCNC: 6 MG/DL — LOW (ref 7–23)
CALCIUM SERPL-MCNC: 9.1 MG/DL — SIGNIFICANT CHANGE UP (ref 8.4–10.5)
CHLORIDE SERPL-SCNC: 104 MMOL/L — SIGNIFICANT CHANGE UP (ref 96–108)
CO2 SERPL-SCNC: 21 MMOL/L — LOW (ref 22–31)
COLOR SPEC: YELLOW — SIGNIFICANT CHANGE UP
COMMENT - URINE: SIGNIFICANT CHANGE UP
CREAT SERPL-MCNC: 0.67 MG/DL — SIGNIFICANT CHANGE UP (ref 0.5–1.3)
DIFF PNL FLD: NEGATIVE — SIGNIFICANT CHANGE UP
EOSINOPHIL # BLD AUTO: 0.2 K/UL — SIGNIFICANT CHANGE UP (ref 0–0.5)
EOSINOPHIL NFR BLD AUTO: 2.4 % — SIGNIFICANT CHANGE UP (ref 0–6)
EPI CELLS # UR: SIGNIFICANT CHANGE UP /HPF
GAS PNL BLDV: SIGNIFICANT CHANGE UP
GLUCOSE SERPL-MCNC: 104 MG/DL — HIGH (ref 70–99)
GLUCOSE UR QL: NEGATIVE — SIGNIFICANT CHANGE UP
HCT VFR BLD CALC: 36 % — SIGNIFICANT CHANGE UP (ref 34.5–45)
HGB BLD-MCNC: 12.4 G/DL — SIGNIFICANT CHANGE UP (ref 11.5–15.5)
KETONES UR-MCNC: NEGATIVE — SIGNIFICANT CHANGE UP
LEUKOCYTE ESTERASE UR-ACNC: NEGATIVE — SIGNIFICANT CHANGE UP
LYMPHOCYTES # BLD AUTO: 2.7 K/UL — SIGNIFICANT CHANGE UP (ref 1–3.3)
LYMPHOCYTES # BLD AUTO: 33.6 % — SIGNIFICANT CHANGE UP (ref 13–44)
MCHC RBC-ENTMCNC: 28.6 PG — SIGNIFICANT CHANGE UP (ref 27–34)
MCHC RBC-ENTMCNC: 34.6 GM/DL — SIGNIFICANT CHANGE UP (ref 32–36)
MCV RBC AUTO: 82.9 FL — SIGNIFICANT CHANGE UP (ref 80–100)
MONOCYTES # BLD AUTO: 0.7 K/UL — SIGNIFICANT CHANGE UP (ref 0–0.9)
MONOCYTES NFR BLD AUTO: 9.2 % — SIGNIFICANT CHANGE UP (ref 2–14)
NEUTROPHILS # BLD AUTO: 4.4 K/UL — SIGNIFICANT CHANGE UP (ref 1.8–7.4)
NEUTROPHILS NFR BLD AUTO: 54.3 % — SIGNIFICANT CHANGE UP (ref 43–77)
NITRITE UR-MCNC: NEGATIVE — SIGNIFICANT CHANGE UP
PH UR: 6.5 — SIGNIFICANT CHANGE UP (ref 4.8–8)
PLATELET # BLD AUTO: 236 K/UL — SIGNIFICANT CHANGE UP (ref 150–400)
POTASSIUM SERPL-MCNC: 4.1 MMOL/L — SIGNIFICANT CHANGE UP (ref 3.5–5.3)
POTASSIUM SERPL-SCNC: 4.1 MMOL/L — SIGNIFICANT CHANGE UP (ref 3.5–5.3)
PROT SERPL-MCNC: 7 G/DL — SIGNIFICANT CHANGE UP (ref 6–8.3)
PROT UR-MCNC: SIGNIFICANT CHANGE UP
RBC # BLD: 4.35 M/UL — SIGNIFICANT CHANGE UP (ref 3.8–5.2)
RBC # FLD: 13 % — SIGNIFICANT CHANGE UP (ref 10.3–14.5)
SODIUM SERPL-SCNC: 141 MMOL/L — SIGNIFICANT CHANGE UP (ref 135–145)
SP GR SPEC: 1.01 — SIGNIFICANT CHANGE UP (ref 1.01–1.02)
UROBILINOGEN FLD QL: NEGATIVE — SIGNIFICANT CHANGE UP
WBC # BLD: 8 K/UL — SIGNIFICANT CHANGE UP (ref 3.8–10.5)
WBC # FLD AUTO: 8 K/UL — SIGNIFICANT CHANGE UP (ref 3.8–10.5)
WBC UR QL: SIGNIFICANT CHANGE UP /HPF (ref 0–5)

## 2017-04-01 PROCEDURE — 82550 ASSAY OF CK (CPK): CPT

## 2017-04-01 PROCEDURE — 85027 COMPLETE CBC AUTOMATED: CPT

## 2017-04-01 PROCEDURE — 82803 BLOOD GASES ANY COMBINATION: CPT

## 2017-04-01 PROCEDURE — 82553 CREATINE MB FRACTION: CPT

## 2017-04-01 PROCEDURE — 85014 HEMATOCRIT: CPT

## 2017-04-01 PROCEDURE — 96374 THER/PROPH/DIAG INJ IV PUSH: CPT | Mod: XU

## 2017-04-01 PROCEDURE — 83605 ASSAY OF LACTIC ACID: CPT

## 2017-04-01 PROCEDURE — 82947 ASSAY GLUCOSE BLOOD QUANT: CPT

## 2017-04-01 PROCEDURE — 84295 ASSAY OF SERUM SODIUM: CPT

## 2017-04-01 PROCEDURE — 99285 EMERGENCY DEPT VISIT HI MDM: CPT

## 2017-04-01 PROCEDURE — 84484 ASSAY OF TROPONIN QUANT: CPT

## 2017-04-01 PROCEDURE — 84132 ASSAY OF SERUM POTASSIUM: CPT

## 2017-04-01 PROCEDURE — 74174 CTA ABD&PLVS W/CONTRAST: CPT

## 2017-04-01 PROCEDURE — 99284 EMERGENCY DEPT VISIT MOD MDM: CPT | Mod: 25

## 2017-04-01 PROCEDURE — 81001 URINALYSIS AUTO W/SCOPE: CPT

## 2017-04-01 PROCEDURE — 74174 CTA ABD&PLVS W/CONTRAST: CPT | Mod: 26

## 2017-04-01 PROCEDURE — 76705 ECHO EXAM OF ABDOMEN: CPT | Mod: 26

## 2017-04-01 PROCEDURE — 80053 COMPREHEN METABOLIC PANEL: CPT

## 2017-04-01 PROCEDURE — 93005 ELECTROCARDIOGRAM TRACING: CPT

## 2017-04-01 PROCEDURE — 82435 ASSAY OF BLOOD CHLORIDE: CPT

## 2017-04-01 PROCEDURE — 76705 ECHO EXAM OF ABDOMEN: CPT

## 2017-04-01 PROCEDURE — 82330 ASSAY OF CALCIUM: CPT

## 2017-04-01 RX ORDER — SODIUM CHLORIDE 9 MG/ML
1000 INJECTION INTRAMUSCULAR; INTRAVENOUS; SUBCUTANEOUS ONCE
Qty: 0 | Refills: 0 | Status: COMPLETED | OUTPATIENT
Start: 2017-04-01 | End: 2017-04-01

## 2017-04-01 RX ADMIN — Medication 1 TABLET(S): at 23:38

## 2017-04-01 RX ADMIN — SODIUM CHLORIDE 1000 MILLILITER(S): 9 INJECTION INTRAMUSCULAR; INTRAVENOUS; SUBCUTANEOUS at 19:44

## 2017-04-01 NOTE — ED PROCEDURE NOTE - PROCEDURE ADDITIONAL DETAILS
POCUS: no visualized bowel wall edema, no evidence of acute3 cholecystitis, hyperechoic foci seen within the gallbladder

## 2017-04-01 NOTE — ED PROVIDER NOTE - PHYSICAL EXAMINATION
Attending Goldman: Gen: nad, heent: atrauamtic, eomi, perrla, sclera anicteric, mmm, neck: nttp, cv: rrr, lungs: ctab, abd: soft, ttp lower abdomen worse on left, mild RUQ and RLQ ttp, no pulsatile mass, ext: wwp, neuro; awake and alert, follwing commands

## 2017-04-01 NOTE — ED PROVIDER NOTE - MEDICAL DECISION MAKING DETAILS
71f w/ hx GIST s/p gastrectomy, diverticulosis, recent hospital admission for ischemic colitis from 3/27-3/29 discharged on po cipro/flagyl presents with nausea. Likely 2/2 cipro/flagyl, stopped taking 4pm dose. Will check basic labs. d/c on alternative medication. 71f w/ hx GIST s/p gastrectomy, diverticulosis, recent hospital admission for ischemic colitis from 3/27-3/29 discharged on po cipro/flagyl presents with nausea. Likely 2/2 cipro/flagyl, stopped taking 4pm dose. Will check basic labs. d/c on alternative medication.  Attending Goldman: 72 y/o female s/p recent admission for ischemic colitis d/c on flagyl and cipro presenting with palpitations and persistent abdominal pain. dizziness and palpitations could be secondary to medication side effect. pt denies any bloody stools since discharge. unclear cause of ischemic colitis. no known h/o afib. with continued pain and h/o ischemic colitis will check CTA abd to evalaute for mesenteric ischemia and provide hydration. will re-eval

## 2017-04-01 NOTE — ED ADULT NURSE NOTE - OBJECTIVE STATEMENT
72 yr old female 71 yr old female with PMHx partial gastrectomy, gastritis, HTN and colitis comes in today with c/o nausea, dizzy, weakness s/p taking cipro and flagyl. pt was dx with colitis at [facility] and was admitted she was discharged 3/29/17 and was told to take cipro and flagyl, she felt great at discharge but after two days on this medication she has abdominal discomfort, dizziness, weakness, and nausea. she didn't take her dose that was due at 4 and feels much better. no symptoms at this time. pt appears well, alert and oriented x 4. PERRLA brisk b.l [place] strong x 4. abd soft, NT, ND. denies nausea/vomiting, fevers, chills, diarrhea, blood in stool, change in vision, headache, numbness or tingling at this time. safety and comfort maintained.

## 2017-04-01 NOTE — ED PROVIDER NOTE - OBJECTIVE STATEMENT
71f w/ hx GIST s/p gastrectomy, diverticulosis, recent hospital admission for ischemic colitis from 3/27-3/29 discharged on po cipro/flagyl presents with nausea. On discharge pt was able to tolerate PO, no abdominal  pain, diarrhea, fever or chills. Since she started taking po cipro/flagyl she started having nausea, weakness and dizziness. She has no vomiting, able to tolerate PO, ate turkey sandwich in AM.  GI: Dr. Esquivel 71f w/ hx GIST s/p partial gastrectomy, diverticulosis, recent hospital admission for ischemic colitis from 3/27-3/29 discharged on po cipro/flagyl presents with nausea. On discharge pt was able to tolerate PO, no abdominal  pain, diarrhea, fever or chills. Since she started taking po cipro/flagyl she started having nausea, weakness and dizziness. She has no vomiting, able to tolerate PO, ate turkey sandwich in AM.  GI: Dr. Esquivel 71f w/ hx GIST s/p partial gastrectomy, diverticulosis, recent hospital admission for ischemic colitis from 3/27-3/29 discharged on po cipro/flagyl presents with nausea. On discharge pt was able to tolerate PO, no abdominal  pain, diarrhea, fever or chills. Since she started taking po cipro/flagyl she started having nausea, weakness and dizziness. She has no vomiting, able to tolerate PO, ate turkey sandwich in AM.  GI: Dr. Esquivel  Attending Goldman: agree with above. additionally pt states soon after taking the medications developed palpitations. no known h/o afib. reports continued lower abdominal pain but somewhat improved. last colonsocpy 1 year ago

## 2017-04-04 ENCOUNTER — APPOINTMENT (OUTPATIENT)
Dept: PULMONOLOGY | Facility: CLINIC | Age: 72
End: 2017-04-04

## 2017-04-04 VITALS
HEART RATE: 84 BPM | WEIGHT: 129 LBS | SYSTOLIC BLOOD PRESSURE: 130 MMHG | RESPIRATION RATE: 16 BRPM | DIASTOLIC BLOOD PRESSURE: 70 MMHG | BODY MASS INDEX: 24.35 KG/M2 | HEIGHT: 61 IN | OXYGEN SATURATION: 96 % | TEMPERATURE: 97.7 F

## 2017-04-04 DIAGNOSIS — R91.1 SOLITARY PULMONARY NODULE: ICD-10-CM

## 2017-04-04 RX ORDER — CIPROFLOXACIN HYDROCHLORIDE 500 MG/1
500 TABLET, FILM COATED ORAL
Qty: 14 | Refills: 0 | Status: COMPLETED | COMMUNITY
Start: 2017-03-29

## 2017-04-04 RX ORDER — METRONIDAZOLE 500 MG/1
500 TABLET ORAL
Qty: 21 | Refills: 0 | Status: COMPLETED | COMMUNITY
Start: 2017-03-29

## 2017-04-11 ENCOUNTER — APPOINTMENT (OUTPATIENT)
Dept: DERMATOLOGY | Facility: CLINIC | Age: 72
End: 2017-04-11

## 2017-04-26 ENCOUNTER — APPOINTMENT (OUTPATIENT)
Dept: CT IMAGING | Facility: IMAGING CENTER | Age: 72
End: 2017-04-26

## 2017-04-26 ENCOUNTER — OUTPATIENT (OUTPATIENT)
Dept: OUTPATIENT SERVICES | Facility: HOSPITAL | Age: 72
LOS: 1 days | End: 2017-04-26
Payer: COMMERCIAL

## 2017-04-26 DIAGNOSIS — Z90.3 ACQUIRED ABSENCE OF STOMACH [PART OF]: Chronic | ICD-10-CM

## 2017-04-26 DIAGNOSIS — Z98.89 OTHER SPECIFIED POSTPROCEDURAL STATES: Chronic | ICD-10-CM

## 2017-04-26 DIAGNOSIS — R91.1 SOLITARY PULMONARY NODULE: ICD-10-CM

## 2017-04-26 PROCEDURE — 71250 CT THORAX DX C-: CPT

## 2017-05-17 ENCOUNTER — EMERGENCY (EMERGENCY)
Facility: HOSPITAL | Age: 72
LOS: 1 days | Discharge: HOME | End: 2017-05-17
Attending: EMERGENCY MEDICINE | Admitting: EMERGENCY MEDICINE
Payer: COMMERCIAL

## 2017-05-17 VITALS
RESPIRATION RATE: 20 BRPM | SYSTOLIC BLOOD PRESSURE: 144 MMHG | DIASTOLIC BLOOD PRESSURE: 74 MMHG | TEMPERATURE: 98 F | OXYGEN SATURATION: 98 % | HEART RATE: 68 BPM

## 2017-05-17 DIAGNOSIS — Z91.018 ALLERGY TO OTHER FOODS: ICD-10-CM

## 2017-05-17 DIAGNOSIS — A08.4 VIRAL INTESTINAL INFECTION, UNSPECIFIED: ICD-10-CM

## 2017-05-17 DIAGNOSIS — R19.7 DIARRHEA, UNSPECIFIED: ICD-10-CM

## 2017-05-17 DIAGNOSIS — Z98.890 OTHER SPECIFIED POSTPROCEDURAL STATES: ICD-10-CM

## 2017-05-17 DIAGNOSIS — Z90.3 ACQUIRED ABSENCE OF STOMACH [PART OF]: Chronic | ICD-10-CM

## 2017-05-17 DIAGNOSIS — I10 ESSENTIAL (PRIMARY) HYPERTENSION: ICD-10-CM

## 2017-05-17 DIAGNOSIS — E86.0 DEHYDRATION: ICD-10-CM

## 2017-05-17 DIAGNOSIS — Z79.899 OTHER LONG TERM (CURRENT) DRUG THERAPY: ICD-10-CM

## 2017-05-17 DIAGNOSIS — Z98.89 OTHER SPECIFIED POSTPROCEDURAL STATES: Chronic | ICD-10-CM

## 2017-05-17 DIAGNOSIS — Z79.82 LONG TERM (CURRENT) USE OF ASPIRIN: ICD-10-CM

## 2017-05-17 LAB
ALBUMIN SERPL ELPH-MCNC: 4 G/DL — SIGNIFICANT CHANGE UP (ref 3.3–5)
ALP SERPL-CCNC: 68 U/L — SIGNIFICANT CHANGE UP (ref 40–120)
ALT FLD-CCNC: 25 U/L RC — SIGNIFICANT CHANGE UP (ref 10–45)
ANION GAP SERPL CALC-SCNC: 12 MMOL/L — SIGNIFICANT CHANGE UP (ref 5–17)
APTT BLD: 31.9 SEC — SIGNIFICANT CHANGE UP (ref 27.5–37.4)
AST SERPL-CCNC: 28 U/L — SIGNIFICANT CHANGE UP (ref 10–40)
BASE EXCESS BLDV CALC-SCNC: 6.4 MMOL/L — HIGH (ref -2–2)
BASOPHILS # BLD AUTO: 0 K/UL — SIGNIFICANT CHANGE UP (ref 0–0.2)
BASOPHILS NFR BLD AUTO: 0.4 % — SIGNIFICANT CHANGE UP (ref 0–2)
BILIRUB SERPL-MCNC: 0.4 MG/DL — SIGNIFICANT CHANGE UP (ref 0.2–1.2)
BUN SERPL-MCNC: 5 MG/DL — LOW (ref 7–23)
CA-I SERPL-SCNC: 1.11 MMOL/L — LOW (ref 1.12–1.3)
CALCIUM SERPL-MCNC: 8.6 MG/DL — SIGNIFICANT CHANGE UP (ref 8.4–10.5)
CHLORIDE BLDV-SCNC: 102 MMOL/L — SIGNIFICANT CHANGE UP (ref 96–108)
CHLORIDE SERPL-SCNC: 102 MMOL/L — SIGNIFICANT CHANGE UP (ref 96–108)
CO2 BLDV-SCNC: 32 MMOL/L — HIGH (ref 22–30)
CO2 SERPL-SCNC: 27 MMOL/L — SIGNIFICANT CHANGE UP (ref 22–31)
CREAT SERPL-MCNC: 0.66 MG/DL — SIGNIFICANT CHANGE UP (ref 0.5–1.3)
EOSINOPHIL # BLD AUTO: 0.2 K/UL — SIGNIFICANT CHANGE UP (ref 0–0.5)
EOSINOPHIL NFR BLD AUTO: 2.5 % — SIGNIFICANT CHANGE UP (ref 0–6)
GAS PNL BLDV: 139 MMOL/L — SIGNIFICANT CHANGE UP (ref 136–145)
GAS PNL BLDV: SIGNIFICANT CHANGE UP
GAS PNL BLDV: SIGNIFICANT CHANGE UP
GLUCOSE BLDV-MCNC: 88 MG/DL — SIGNIFICANT CHANGE UP (ref 70–99)
GLUCOSE SERPL-MCNC: 87 MG/DL — SIGNIFICANT CHANGE UP (ref 70–99)
HCO3 BLDV-SCNC: 31 MMOL/L — HIGH (ref 21–29)
HCT VFR BLD CALC: 34.1 % — LOW (ref 34.5–45)
HCT VFR BLDA CALC: 36 % — LOW (ref 39–50)
HGB BLD CALC-MCNC: 11.8 G/DL — SIGNIFICANT CHANGE UP (ref 11.5–15.5)
HGB BLD-MCNC: 11.5 G/DL — SIGNIFICANT CHANGE UP (ref 11.5–15.5)
INR BLD: 1.03 RATIO — SIGNIFICANT CHANGE UP (ref 0.88–1.16)
LACTATE BLDV-MCNC: 1.1 MMOL/L — SIGNIFICANT CHANGE UP (ref 0.7–2)
LIDOCAIN IGE QN: 43 U/L — SIGNIFICANT CHANGE UP (ref 7–60)
LYMPHOCYTES # BLD AUTO: 4.1 K/UL — HIGH (ref 1–3.3)
LYMPHOCYTES # BLD AUTO: 42.2 % — SIGNIFICANT CHANGE UP (ref 13–44)
MCHC RBC-ENTMCNC: 27.6 PG — SIGNIFICANT CHANGE UP (ref 27–34)
MCHC RBC-ENTMCNC: 33.6 GM/DL — SIGNIFICANT CHANGE UP (ref 32–36)
MCV RBC AUTO: 82.2 FL — SIGNIFICANT CHANGE UP (ref 80–100)
MONOCYTES # BLD AUTO: 0.7 K/UL — SIGNIFICANT CHANGE UP (ref 0–0.9)
MONOCYTES NFR BLD AUTO: 6.9 % — SIGNIFICANT CHANGE UP (ref 2–14)
NEUTROPHILS # BLD AUTO: 4.7 K/UL — SIGNIFICANT CHANGE UP (ref 1.8–7.4)
NEUTROPHILS NFR BLD AUTO: 48 % — SIGNIFICANT CHANGE UP (ref 43–77)
PCO2 BLDV: 47 MMHG — SIGNIFICANT CHANGE UP (ref 35–50)
PH BLDV: 7.44 — SIGNIFICANT CHANGE UP (ref 7.35–7.45)
PLATELET # BLD AUTO: 305 K/UL — SIGNIFICANT CHANGE UP (ref 150–400)
PO2 BLDV: 28 MMHG — SIGNIFICANT CHANGE UP (ref 25–45)
POTASSIUM BLDV-SCNC: 4 MMOL/L — SIGNIFICANT CHANGE UP (ref 3.5–5)
POTASSIUM SERPL-MCNC: 3.7 MMOL/L — SIGNIFICANT CHANGE UP (ref 3.5–5.3)
POTASSIUM SERPL-SCNC: 3.7 MMOL/L — SIGNIFICANT CHANGE UP (ref 3.5–5.3)
PROT SERPL-MCNC: 7 G/DL — SIGNIFICANT CHANGE UP (ref 6–8.3)
PROTHROM AB SERPL-ACNC: 11.1 SEC — SIGNIFICANT CHANGE UP (ref 9.8–12.7)
RBC # BLD: 4.15 M/UL — SIGNIFICANT CHANGE UP (ref 3.8–5.2)
RBC # FLD: 12.3 % — SIGNIFICANT CHANGE UP (ref 10.3–14.5)
SAO2 % BLDV: 53 % — LOW (ref 67–88)
SODIUM SERPL-SCNC: 141 MMOL/L — SIGNIFICANT CHANGE UP (ref 135–145)
WBC # BLD: 9.8 K/UL — SIGNIFICANT CHANGE UP (ref 3.8–10.5)
WBC # FLD AUTO: 9.8 K/UL — SIGNIFICANT CHANGE UP (ref 3.8–10.5)

## 2017-05-17 PROCEDURE — 83690 ASSAY OF LIPASE: CPT

## 2017-05-17 PROCEDURE — 82330 ASSAY OF CALCIUM: CPT

## 2017-05-17 PROCEDURE — 85610 PROTHROMBIN TIME: CPT

## 2017-05-17 PROCEDURE — 82947 ASSAY GLUCOSE BLOOD QUANT: CPT

## 2017-05-17 PROCEDURE — 80053 COMPREHEN METABOLIC PANEL: CPT

## 2017-05-17 PROCEDURE — 83605 ASSAY OF LACTIC ACID: CPT

## 2017-05-17 PROCEDURE — 82803 BLOOD GASES ANY COMBINATION: CPT

## 2017-05-17 PROCEDURE — 96374 THER/PROPH/DIAG INJ IV PUSH: CPT

## 2017-05-17 PROCEDURE — 99284 EMERGENCY DEPT VISIT MOD MDM: CPT

## 2017-05-17 PROCEDURE — 84132 ASSAY OF SERUM POTASSIUM: CPT

## 2017-05-17 PROCEDURE — 99284 EMERGENCY DEPT VISIT MOD MDM: CPT | Mod: 25

## 2017-05-17 PROCEDURE — 85730 THROMBOPLASTIN TIME PARTIAL: CPT

## 2017-05-17 PROCEDURE — 84295 ASSAY OF SERUM SODIUM: CPT

## 2017-05-17 PROCEDURE — 82435 ASSAY OF BLOOD CHLORIDE: CPT

## 2017-05-17 PROCEDURE — 85027 COMPLETE CBC AUTOMATED: CPT

## 2017-05-17 PROCEDURE — 85014 HEMATOCRIT: CPT

## 2017-05-17 RX ORDER — ONDANSETRON 8 MG/1
4 TABLET, FILM COATED ORAL ONCE
Qty: 0 | Refills: 0 | Status: COMPLETED | OUTPATIENT
Start: 2017-05-17 | End: 2017-05-17

## 2017-05-17 RX ORDER — ONDANSETRON 8 MG/1
1 TABLET, FILM COATED ORAL
Qty: 12 | Refills: 0
Start: 2017-05-17 | End: 2017-05-20

## 2017-05-17 RX ORDER — SODIUM CHLORIDE 9 MG/ML
1000 INJECTION INTRAMUSCULAR; INTRAVENOUS; SUBCUTANEOUS ONCE
Qty: 0 | Refills: 0 | Status: COMPLETED | OUTPATIENT
Start: 2017-05-17 | End: 2017-05-17

## 2017-05-17 RX ADMIN — SODIUM CHLORIDE 1000 MILLILITER(S): 9 INJECTION INTRAMUSCULAR; INTRAVENOUS; SUBCUTANEOUS at 17:18

## 2017-05-17 RX ADMIN — ONDANSETRON 4 MILLIGRAM(S): 8 TABLET, FILM COATED ORAL at 17:19

## 2017-05-17 NOTE — ED PROVIDER NOTE - ATTENDING CONTRIBUTION TO CARE
I, Dr. Matt Ruelas, saw and examined this patient and discussed the plan of care with the PA.  Pt with vomiting, diarrhea, ab pain while in [facility], better now but trouble eating food, last vomit yesterday, no ab pain, fever, diarrhea currently.  Nontender abdomen, slightly dry mm.

## 2017-05-17 NOTE — ED PROVIDER NOTE - OBJECTIVE STATEMENT
72 F w Ischemic Colitis, Diverticulitis, HTN reports that she was in [facility] last week and came back 5 days ago. She experienced vomiting, and diarrhea after eating food last week. This lasted for 2 days. The diarrhea has since resolved. She denies abdominal pain, admits that she has nausea, vomiting and unable to tolerate solid foods since yesterday. She has been able to tolerate water. Last week she also had fever and chills which resolved 2 days later. She was given Ciporfloxacin last week in [facility] when she had diarrhea, but this caused nausea so she stopped taking it 4 days ago.

## 2017-05-17 NOTE — ED PROVIDER NOTE - PLAN OF CARE
Follow up with your Primary Care Physician within the next 2-3 days  Take Zofran 4 mg by mouth every 6 hours as needed for nausea  Take Pepcid 20mg once daily for reflux until you follow up with your Primary Doctor  Bring a copy of your test results with you to your appointment  Continue your current medication regimen  Return to the Emergency Room if you experience new or worsening symptoms

## 2017-05-17 NOTE — ED ADULT NURSE NOTE - OBJECTIVE STATEMENT
72 yr old female coming in c/o abdominal pain; recent travel to [facility] where she experienced some abd pain, n/v/d; prescribed Cipro but denies fevers, chills, chest pain or sob. Patient also states she has had decreased PO intake as well. Patient appears well, A&Ox3, ambulatory to ED. States she has some nausea at this time. IV 20G right AC.

## 2017-05-17 NOTE — ED PROVIDER NOTE - CARE PLAN
Principal Discharge DX:	Viral gastritis  Secondary Diagnosis:	Dehydration Principal Discharge DX:	Viral gastritis  Instructions for follow-up, activity and diet:	Follow up with your Primary Care Physician within the next 2-3 days  Take Zofran 4 mg by mouth every 6 hours as needed for nausea  Take Pepcid 20mg once daily for reflux until you follow up with your Primary Doctor  Bring a copy of your test results with you to your appointment  Continue your current medication regimen  Return to the Emergency Room if you experience new or worsening symptoms  Secondary Diagnosis:	Dehydration

## 2017-05-17 NOTE — ED PROVIDER NOTE - MEDICAL DECISION MAKING DETAILS
Dr. Ruelas Note: nontender abdomen, tolerating some Po, no indication for imaging or antibx at this time

## 2017-05-18 ENCOUNTER — OTHER (OUTPATIENT)
Age: 72
End: 2017-05-18

## 2017-05-24 ENCOUNTER — CHART COPY (OUTPATIENT)
Age: 72
End: 2017-05-24

## 2017-07-12 ENCOUNTER — APPOINTMENT (OUTPATIENT)
Dept: ORTHOPEDIC SURGERY | Facility: CLINIC | Age: 72
End: 2017-07-12

## 2017-09-05 ENCOUNTER — OUTPATIENT (OUTPATIENT)
Dept: OUTPATIENT SERVICES | Facility: HOSPITAL | Age: 72
LOS: 1 days | End: 2017-09-05
Payer: COMMERCIAL

## 2017-09-05 ENCOUNTER — APPOINTMENT (OUTPATIENT)
Dept: CT IMAGING | Facility: IMAGING CENTER | Age: 72
End: 2017-09-05
Payer: MEDICARE

## 2017-09-05 DIAGNOSIS — Z90.3 ACQUIRED ABSENCE OF STOMACH [PART OF]: Chronic | ICD-10-CM

## 2017-09-05 DIAGNOSIS — Z98.89 OTHER SPECIFIED POSTPROCEDURAL STATES: Chronic | ICD-10-CM

## 2017-09-05 DIAGNOSIS — C49.A4 GASTROINTESTINAL STROMAL TUMOR OF LARGE INTESTINE: ICD-10-CM

## 2017-09-05 PROCEDURE — 74176 CT ABD & PELVIS W/O CONTRAST: CPT | Mod: 26

## 2017-09-05 PROCEDURE — 74176 CT ABD & PELVIS W/O CONTRAST: CPT

## 2017-09-14 ENCOUNTER — APPOINTMENT (OUTPATIENT)
Dept: SURGICAL ONCOLOGY | Facility: CLINIC | Age: 72
End: 2017-09-14
Payer: MEDICARE

## 2017-09-14 VITALS
HEART RATE: 59 BPM | BODY MASS INDEX: 22.47 KG/M2 | DIASTOLIC BLOOD PRESSURE: 66 MMHG | HEIGHT: 61 IN | OXYGEN SATURATION: 98 % | WEIGHT: 119 LBS | RESPIRATION RATE: 16 BRPM | SYSTOLIC BLOOD PRESSURE: 110 MMHG

## 2017-09-14 DIAGNOSIS — C49.A4 GASTROINTESTINAL STROMAL TUMOR OF LARGE INTESTINE: ICD-10-CM

## 2017-09-14 DIAGNOSIS — D23.9 OTHER BENIGN NEOPLASM OF SKIN, UNSPECIFIED: ICD-10-CM

## 2017-09-14 PROCEDURE — 99214 OFFICE O/P EST MOD 30 MIN: CPT

## 2017-09-14 RX ORDER — AMOXICILLIN AND CLAVULANATE POTASSIUM 875; 125 MG/1; MG/1
875-125 TABLET, COATED ORAL
Qty: 20 | Refills: 0 | Status: DISCONTINUED | COMMUNITY
Start: 2017-04-02 | End: 2017-09-14

## 2017-09-14 RX ORDER — TRIAMCINOLONE ACETONIDE 1 MG/G
0.1 OINTMENT TOPICAL
Qty: 1 | Refills: 3 | Status: DISCONTINUED | COMMUNITY
Start: 2017-02-14 | End: 2017-09-14

## 2017-10-25 ENCOUNTER — APPOINTMENT (OUTPATIENT)
Dept: RHEUMATOLOGY | Facility: CLINIC | Age: 72
End: 2017-10-25

## 2017-11-01 ENCOUNTER — APPOINTMENT (OUTPATIENT)
Dept: RHEUMATOLOGY | Facility: CLINIC | Age: 72
End: 2017-11-01

## 2018-03-28 ENCOUNTER — CHART COPY (OUTPATIENT)
Age: 73
End: 2018-03-28

## 2018-04-08 ENCOUNTER — APPOINTMENT (OUTPATIENT)
Dept: CT IMAGING | Facility: IMAGING CENTER | Age: 73
End: 2018-04-08
Payer: MEDICARE

## 2018-04-08 ENCOUNTER — OUTPATIENT (OUTPATIENT)
Dept: OUTPATIENT SERVICES | Facility: HOSPITAL | Age: 73
LOS: 1 days | End: 2018-04-08
Payer: COMMERCIAL

## 2018-04-08 DIAGNOSIS — Z98.89 OTHER SPECIFIED POSTPROCEDURAL STATES: Chronic | ICD-10-CM

## 2018-04-08 DIAGNOSIS — Z90.3 ACQUIRED ABSENCE OF STOMACH [PART OF]: Chronic | ICD-10-CM

## 2018-04-08 DIAGNOSIS — Z00.8 ENCOUNTER FOR OTHER GENERAL EXAMINATION: ICD-10-CM

## 2018-04-08 PROCEDURE — 74178 CT ABD&PLV WO CNTR FLWD CNTR: CPT

## 2018-04-08 PROCEDURE — 74177 CT ABD & PELVIS W/CONTRAST: CPT | Mod: 26

## 2018-04-08 PROCEDURE — 82565 ASSAY OF CREATININE: CPT

## 2018-04-08 PROCEDURE — 74177 CT ABD & PELVIS W/CONTRAST: CPT

## 2018-04-25 NOTE — H&P ADULT. - HISTORY OF PRESENT ILLNESS
72yo F with PMH diverticulitis and GIST s/p partial gastrectomy about 1 year ago (Dr. Avila), presents to ER with abdominal pain. The pt first noted some discomfort about 4 days ago, but had not other symptoms at that time and the pain was mild. Yesterday, a few hours after eating some undercooked tortillas at a restaurant, she began having worsening abdominal pain and diarrhea. This morning the pain became very severe and the diarrhea was blood tinged (mostly water). She also vomited twice yesterday.     Her  had some abdominal cramps after eating the same food, but did not have diarrhea or vomiting and is feeling better this morning.
26-Apr-2018 05:16

## 2018-08-10 ENCOUNTER — APPOINTMENT (OUTPATIENT)
Dept: DERMATOLOGY | Facility: CLINIC | Age: 73
End: 2018-08-10
Payer: MEDICARE

## 2018-08-10 VITALS — DIASTOLIC BLOOD PRESSURE: 78 MMHG | SYSTOLIC BLOOD PRESSURE: 120 MMHG

## 2018-08-10 DIAGNOSIS — L50.9 URTICARIA, UNSPECIFIED: ICD-10-CM

## 2018-08-10 PROCEDURE — 99213 OFFICE O/P EST LOW 20 MIN: CPT | Mod: GC

## 2018-08-10 RX ORDER — TRIAMCINOLONE ACETONIDE 1 MG/G
0.1 OINTMENT TOPICAL
Qty: 1 | Refills: 4 | Status: ACTIVE | COMMUNITY
Start: 2018-08-10 | End: 1900-01-01

## 2018-08-10 RX ORDER — CETIRIZINE HYDROCHLORIDE 10 MG/1
10 TABLET, COATED ORAL
Qty: 30 | Refills: 4 | Status: ACTIVE | COMMUNITY
Start: 2017-02-14 | End: 1900-01-01

## 2018-08-21 ENCOUNTER — EMERGENCY (EMERGENCY)
Facility: HOSPITAL | Age: 73
LOS: 1 days | Discharge: HOME | End: 2018-08-21
Attending: EMERGENCY MEDICINE
Payer: COMMERCIAL

## 2018-08-21 VITALS
OXYGEN SATURATION: 98 % | SYSTOLIC BLOOD PRESSURE: 153 MMHG | DIASTOLIC BLOOD PRESSURE: 85 MMHG | HEART RATE: 59 BPM | RESPIRATION RATE: 18 BRPM | WEIGHT: 117.07 LBS | TEMPERATURE: 98 F

## 2018-08-21 VITALS
DIASTOLIC BLOOD PRESSURE: 79 MMHG | SYSTOLIC BLOOD PRESSURE: 149 MMHG | RESPIRATION RATE: 18 BRPM | OXYGEN SATURATION: 98 % | HEART RATE: 58 BPM | TEMPERATURE: 98 F

## 2018-08-21 DIAGNOSIS — Z90.3 ACQUIRED ABSENCE OF STOMACH [PART OF]: Chronic | ICD-10-CM

## 2018-08-21 DIAGNOSIS — Z98.89 OTHER SPECIFIED POSTPROCEDURAL STATES: Chronic | ICD-10-CM

## 2018-08-21 PROBLEM — D21.4 BENIGN NEOPLASM OF CONNECTIVE AND OTHER SOFT TISSUE OF ABDOMEN: Chronic | Status: ACTIVE | Noted: 2017-03-27

## 2018-08-21 LAB
CRP SERPL-MCNC: 0.24 MG/DL — SIGNIFICANT CHANGE UP (ref 0–0.4)
ERYTHROCYTE [SEDIMENTATION RATE] IN BLOOD: 12 MM/HR — SIGNIFICANT CHANGE UP (ref 0–20)

## 2018-08-21 PROCEDURE — 86140 C-REACTIVE PROTEIN: CPT

## 2018-08-21 PROCEDURE — 85652 RBC SED RATE AUTOMATED: CPT

## 2018-08-21 PROCEDURE — 99282 EMERGENCY DEPT VISIT SF MDM: CPT

## 2018-08-21 PROCEDURE — 85027 COMPLETE CBC AUTOMATED: CPT

## 2018-08-21 PROCEDURE — 70450 CT HEAD/BRAIN W/O DYE: CPT

## 2018-08-21 PROCEDURE — 70450 CT HEAD/BRAIN W/O DYE: CPT | Mod: 26

## 2018-08-21 PROCEDURE — 99284 EMERGENCY DEPT VISIT MOD MDM: CPT

## 2018-08-21 PROCEDURE — 99284 EMERGENCY DEPT VISIT MOD MDM: CPT | Mod: 25

## 2018-08-21 RX ORDER — ACETAMINOPHEN 500 MG
650 TABLET ORAL ONCE
Qty: 0 | Refills: 0 | Status: COMPLETED | OUTPATIENT
Start: 2018-08-21 | End: 2018-08-21

## 2018-08-21 RX ADMIN — Medication 650 MILLIGRAM(S): at 11:43

## 2018-08-21 NOTE — ED ADULT NURSE NOTE - OBJECTIVE STATEMENT
73y female a&oX4, ambulatory, well in appearance presents to the ED c/o ha. pt reports intermittent R. sided ha for 3 weeks 73y female a&oX4, ambulatory, well in appearance presents to the ED c/o ha. pt reports intermittent R. sided pacheco for 3 weeks, described as sharp and shooting. denies visual changes, n/v, CP, SOB, dizziness. Reporting ringing in R. ear. Pt appears to be in no apparent distress. Gross neuro intact. PERRL

## 2018-08-21 NOTE — ED PROVIDER NOTE - MEDICAL DECISION MAKING DETAILS
73F hx of glaucoma and HTN presenting to ED with one week of right-sided temporal headache. No history of headaches. No subjective visual changes but objective decreased acuity found in affected eye raises concern for acute ophthalmologic pathology. Pressure in affected eye 20, acute angle glaucoma less of a concern for now. No acute neurologic findings, CVA / SAH less likely. Cannot r/o temporal arteritis or mass. Will obtain CT head, optho consult, and ESR. 73F hx of glaucoma and HTN presenting to ED with one week of right-sided temporal headache. No history of headaches. No subjective visual changes but objective decreased acuity found in affected eye raises concern for acute ophthalmologic pathology. Pressure in affected eye 20, acute angle glaucoma less of a concern for now. No acute neurologic findings, CVA / SAH less likely. Cannot r/o temporal arteritis or mass. Will obtain CT head, optho consult, and ESR.    mary ellen pt w ho glaucomo on tim[place] with progressive 1 week of am temp ha and minimal eye /vision changes -- am photophobia - pain 1-3/10 no n/v no weakness numbness - nml neuro exam streth 5/5 pupills reactive , eomi, no periorb erythema minimal injection -- vision affected eye 20/100 nml eye20/25 - concern for glaucoma however iop in ed 18/20 -- concern for temp art - will ck esr , ct head , analgesia and reeval

## 2018-08-21 NOTE — ED PROVIDER NOTE - PHYSICAL EXAMINATION
Gen: AAOx3, non-toxic  Head: NCAT  HEENT: right eye mildly injected compared to left. normal appearing TMs. . Visual acuity is 20/100 in affected eye vs. 20/25. Pressure in affected eye 20.  Lung: CTAB, no respiratory distress, no wheezes/rhonchi/rales B/L, speaking in full sentences  CV: RRR, no murmurs, rubs or gallops  Abd: soft, NTND, no guarding, no CVA tenderness  MSK: no visible deformities  Neuro: No focal sensory or motor deficits, normal CN exam   Skin: Warm, well perfused, no rash  Psych: normal affect.   ~Emmanuel Juarez PGY1

## 2018-08-21 NOTE — CONSULT NOTE ADULT - SUBJECTIVE AND OBJECTIVE BOX
[facility] Consult Note    HPI: "73F hx of glaucoma and HTN presenting to ED with one week of right-sided temporal headache. She describes the pain as sharp, shooting, intermittent, worse in the morning. Nothing makes it better or worse. No subjective visual changes."  Patient denies visual complaints. No blurry vision, vision currently at baseline. Denies eye pain. Patient reports pain as a sharp shooting pain in the right temporal area. Reports onset 1-2 weeks ago with episodes lasting a few seconds. Denies other headache. Denies jaw pain or jaw claudication. Denies scalp tenderness or temporal tenderness. Denies weight loss, fevers or chills. Patient reports FBS OD as well.       PMH: above  POcHx: Glaucoma s/p PI OU  FamOcHx: Denies   Meds: Timolol OU  Allergies: NKDA    ROS:  General (neg), Vision (per HPI), Head and Neck (neg), Pulm (neg), CV (neg), GI (neg),  (neg), Musculoskeletal (neg), Skin/Integ (neg), Neuro (neg), Endocrine (neg), Heme (neg), All/Immuno (neg)    Mood and Affect Appropriate ( x ),  Oriented to Time, Place, and Person x 3 ( x )    Ophthalmology Exam    Visual acuity (sc): 20/25 OD, 20/30 PH20/25 OS  Pupils: PERRL OU, no APD  Ttono: 19 OU  Extraocular movements (EOMs): Full OU, no pain, no diplopia   Confrontational Visual Field (CVF):  Full OU  Color Plates: 12/12 OU    Slit Lamp Exam (SLE):  External:  Flat; no vesicles or skin lesions appreciated    Lids/Lashes/Lacrimal Ducts: WNL OU  Sclera/ Conj: W+Q OU  Cornea: 2-	3+ PEE OU  Anterior Chamber: D+F OU  Iris:  Flat OU; patent PI OU  Lens:  Cl OU    Fundus Exam: dilated with 1% tropicamide and 2.5% phenylephrine  Approval obtained from primary team for dilation  Patient aware that pupils can remained dilated for at least 4-6 hours  Exam performed with 20D lens    Vitreous: wnl OU  Disc, cup/disc: sharp and pink, 0.3 OU  Macula:  wnl OU  Vessels:  wnl OU  Periphery: wnl OU        A/P. Pending full dilated exam   In setting of temporal discomfort would recommend ESR, CRP and platelet count to r/o GCA. Low suspicion in setting of neg ROS. ddx includes herpetic neuralgia prior to HSV presentation although skin without findings and cornea clear.   - Recommend to start artificial tears QID     Follow-Up:  Patient should follow up his/her ophthalmologist or in the [facility] within 1 week of discharge, sooner if symptoms worsen or change.  [address] [place] [address] [phone]    Please call the ophthalmologist to confirm an appointment time and date prior to discharge. [facility] Ophthalmology Consult Note    HPI: "73F hx of glaucoma and HTN presenting to ED with one week of right-sided temporal headache. She describes the pain as sharp, shooting, intermittent, worse in the morning. Nothing makes it better or worse. No subjective visual changes."  Patient denies visual complaints. No blurry vision, vision currently at baseline. Denies eye pain. Patient reports pain as a sharp shooting pain in the right temporal area. Reports onset 1-2 weeks ago with episodes lasting a few seconds. Denies other headache. Denies jaw pain or jaw claudication. Denies scalp tenderness or temporal tenderness. Denies weight loss, fevers or chills. Patient reports FBS OD as well.       PMH: above  POcHx: Glaucoma s/p PI OU  FamOcHx: Denies   Meds: Timolol OU  Allergies: NKDA    ROS:  General (neg), Vision (per HPI), Head and Neck (neg), Pulm (neg), CV (neg), GI (neg),  (neg), Musculoskeletal (neg), Skin/Integ (neg), Neuro (neg), Endocrine (neg), Heme (neg), All/Immuno (neg)    Mood and Affect Appropriate ( x ),  Oriented to Time, Place, and Person x 3 ( x )    Ophthalmology Exam    Visual acuity (sc): 20/25 OD, 20/30 PH20/25 OS  Pupils: PERRL OU, no APD  Ttono: 19 OU  Extraocular movements (EOMs): Full OU, no pain, no diplopia   Confrontational Visual Field (CVF):  Full OU  Color Plates: 12/12 OU    Slit Lamp Exam (SLE):  External:  Flat; no vesicles or skin lesions appreciated    Lids/Lashes/Lacrimal Ducts: WNL OU  Sclera/ Conj: W+Q OU  Cornea: 2-3+ PEE OU  Anterior Chamber: D+F OU  Iris:  Flat OU; patent PI OU  Lens:  Cl OU    Fundus Exam: dilated with 1% tropicamide and 2.5% phenylephrine  Approval obtained from primary team for dilation  Patient aware that pupils can remained dilated for at least 4-6 hours  Exam performed with 20D lens    Vitreous: wnl OU  Disc, cup/disc: sharp and pink OU sup rim thinning OD, MNFL OS  Macula:  wnl OU  Vessels:  wnl OU  Periphery: wnl OU        A/P. 73 y.o F with right temporal pain. Eye exam wnl. ddx includes GCA although low suspicion in setting of neg ROS. ddx also includes herpetic neuralgia prior to HZV activation. Skin without findings and cornea clear at this time.   - In setting of temporal discomfort would recommend ESR, CRP and platelet count to r/o GCA. Low suspicion in setting of neg ROS. ddx includes herpetic neuralgia prior to HZV activation   - Recommend to start artificial tears QID   - Outpatient follow up stressed to patient for further monitoring   - Impression and plan discussed with primary team and patient     Follow-Up:  Patient should follow up his/her ophthalmologist or in the [facility] within 1 week of discharge, sooner if symptoms worsen or change.  [address] [place] [address] [phone]    Please call the ophthalmologist to confirm an appointment time and date prior to discharge.     s/d/w Dr. Goel

## 2018-08-21 NOTE — ED ADULT NURSE NOTE - DISCHARGE TEACHING
follow up with ophthalmologist and ENT for CT results of chronic sinusitis, return for new or worsening s/s (vision changes, rash, severe ha) take over the counter tear drops 4X daily

## 2018-08-21 NOTE — ED ADULT NURSE NOTE - NSIMPLEMENTINTERV_GEN_ALL_ED
Implemented All Universal Safety Interventions:  [place] to call system. Call bell, personal items and telephone within reach. Instruct patient to call for assistance. Room bathroom lighting operational. Non-slip footwear when patient is off stretcher. Physically safe environment: no spills, clutter or unnecessary equipment. Stretcher in lowest position, wheels locked, appropriate side rails in place.

## 2018-08-21 NOTE — ED PROVIDER NOTE - OBJECTIVE STATEMENT
73F hx of glaucoma and HTN presenting to ED with one week of right-sided temporal headache. She describes the pain as sharp, shooting, intermittent, worse in the morning. Nothing makes it better or worse. No subjective vidual changes. Denies nausea, vomiting, SOB, syncope, chest pain, cough. Associated symptoms ringing in the right ear. Visual acuity is 20/100 in affected eye vs. 20/25. Pressure in affected eye 20.

## 2018-08-21 NOTE — ED PROVIDER NOTE - PROGRESS NOTE DETAILS
CT negative for acute pathology, just chronic sphenoid sinusitis. ESR pending. pain much improved sed rate not elevaterd awaitng final recs from optho

## 2018-08-21 NOTE — ED PROVIDER NOTE - NS ED ROS FT
GENERAL: No fever or chills  EYES: no subjective change in vision  HEENT: no trouble swallowing or speaking  CARDIAC: no chest pain  PULMONARY: no cough or SOB  GI: no abdominal pain, no nausea, no vomiting, no diarrhea or constipation  : No changes in urination  SKIN: no rashes  NEURO: +headache as described in HPI, +tinnitus during headache  MSK: No joint pain     ~Emmanuel Juarez PGY1

## 2019-01-08 ENCOUNTER — APPOINTMENT (OUTPATIENT)
Dept: INFECTIOUS DISEASE | Facility: CLINIC | Age: 74
End: 2019-01-08

## 2019-01-23 ENCOUNTER — APPOINTMENT (OUTPATIENT)
Dept: ORTHOPEDIC SURGERY | Facility: CLINIC | Age: 74
End: 2019-01-23
Payer: MEDICARE

## 2019-01-23 VITALS
HEIGHT: 58 IN | WEIGHT: 115 LBS | BODY MASS INDEX: 24.14 KG/M2 | HEART RATE: 63 BPM | DIASTOLIC BLOOD PRESSURE: 66 MMHG | SYSTOLIC BLOOD PRESSURE: 112 MMHG

## 2019-01-23 DIAGNOSIS — M54.9 DORSALGIA, UNSPECIFIED: ICD-10-CM

## 2019-01-23 DIAGNOSIS — G89.29 DORSALGIA, UNSPECIFIED: ICD-10-CM

## 2019-01-23 DIAGNOSIS — M51.37 OTHER INTERVERTEBRAL DISC DEGENERATION, LUMBOSACRAL REGION: ICD-10-CM

## 2019-01-23 PROCEDURE — 99214 OFFICE O/P EST MOD 30 MIN: CPT

## 2019-01-23 PROCEDURE — 72170 X-RAY EXAM OF PELVIS: CPT | Mod: 59

## 2019-01-23 PROCEDURE — 72110 X-RAY EXAM L-2 SPINE 4/>VWS: CPT

## 2019-01-25 ENCOUNTER — APPOINTMENT (OUTPATIENT)
Dept: RHEUMATOLOGY | Facility: CLINIC | Age: 74
End: 2019-01-25
Payer: MEDICARE

## 2019-01-25 ENCOUNTER — LABORATORY RESULT (OUTPATIENT)
Age: 74
End: 2019-01-25

## 2019-01-25 VITALS
BODY MASS INDEX: 23.03 KG/M2 | OXYGEN SATURATION: 98 % | DIASTOLIC BLOOD PRESSURE: 79 MMHG | RESPIRATION RATE: 16 BRPM | HEART RATE: 60 BPM | TEMPERATURE: 98.4 F | SYSTOLIC BLOOD PRESSURE: 129 MMHG | HEIGHT: 61 IN | WEIGHT: 122 LBS

## 2019-01-25 DIAGNOSIS — Z87.39 PERSONAL HISTORY OF OTHER DISEASES OF THE MUSCULOSKELETAL SYSTEM AND CONNECTIVE TISSUE: ICD-10-CM

## 2019-01-25 PROCEDURE — 99213 OFFICE O/P EST LOW 20 MIN: CPT

## 2019-01-26 LAB
ALBUMIN SERPL ELPH-MCNC: 4.4 G/DL
ALP BLD-CCNC: 84 U/L
ALT SERPL-CCNC: 11 U/L
ANION GAP SERPL CALC-SCNC: 13 MMOL/L
AST SERPL-CCNC: 13 U/L
BASOPHILS # BLD AUTO: 0.03 K/UL
BASOPHILS NFR BLD AUTO: 0.3 %
BILIRUB SERPL-MCNC: 0.8 MG/DL
BUN SERPL-MCNC: 14 MG/DL
CALCIUM SERPL-MCNC: 10 MG/DL
CHLORIDE SERPL-SCNC: 102 MMOL/L
CO2 SERPL-SCNC: 24 MMOL/L
CREAT SERPL-MCNC: 0.65 MG/DL
EOSINOPHIL # BLD AUTO: 0.24 K/UL
EOSINOPHIL NFR BLD AUTO: 2.5 %
GLUCOSE SERPL-MCNC: 88 MG/DL
HCT VFR BLD CALC: 39.1 %
HGB BLD-MCNC: 12.8 G/DL
IMM GRANULOCYTES NFR BLD AUTO: 0.2 %
LYMPHOCYTES # BLD AUTO: 3.45 K/UL
LYMPHOCYTES NFR BLD AUTO: 36.3 %
MAN DIFF?: NORMAL
MCHC RBC-ENTMCNC: 27.1 PG
MCHC RBC-ENTMCNC: 32.7 GM/DL
MCV RBC AUTO: 82.8 FL
MONOCYTES # BLD AUTO: 0.65 K/UL
MONOCYTES NFR BLD AUTO: 6.8 %
NEUTROPHILS # BLD AUTO: 5.12 K/UL
NEUTROPHILS NFR BLD AUTO: 53.9 %
PLATELET # BLD AUTO: 321 K/UL
POTASSIUM SERPL-SCNC: 4.6 MMOL/L
PROT SERPL-MCNC: 6.8 G/DL
RBC # BLD: 4.72 M/UL
RBC # FLD: 15.7 %
SODIUM SERPL-SCNC: 139 MMOL/L
TSH SERPL-ACNC: 4.25 UIU/ML
WBC # FLD AUTO: 9.51 K/UL

## 2019-02-11 ENCOUNTER — APPOINTMENT (OUTPATIENT)
Dept: ORTHOPEDIC SURGERY | Facility: CLINIC | Age: 74
End: 2019-02-11
Payer: MEDICARE

## 2019-02-11 ENCOUNTER — APPOINTMENT (OUTPATIENT)
Dept: RHEUMATOLOGY | Facility: CLINIC | Age: 74
End: 2019-02-11

## 2019-02-11 VITALS
DIASTOLIC BLOOD PRESSURE: 72 MMHG | BODY MASS INDEX: 22.09 KG/M2 | HEIGHT: 61 IN | HEART RATE: 64 BPM | SYSTOLIC BLOOD PRESSURE: 120 MMHG | WEIGHT: 117 LBS

## 2019-02-11 DIAGNOSIS — G56.01 CARPAL TUNNEL SYNDROME, RIGHT UPPER LIMB: ICD-10-CM

## 2019-02-11 PROCEDURE — 20526 THER INJECTION CARP TUNNEL: CPT | Mod: 50

## 2019-02-11 PROCEDURE — 99214 OFFICE O/P EST MOD 30 MIN: CPT | Mod: 25

## 2019-02-19 ENCOUNTER — EMERGENCY (EMERGENCY)
Facility: HOSPITAL | Age: 74
LOS: 1 days | Discharge: HOME | End: 2019-02-19
Attending: EMERGENCY MEDICINE
Payer: COMMERCIAL

## 2019-02-19 VITALS
RESPIRATION RATE: 16 BRPM | HEIGHT: 60 IN | OXYGEN SATURATION: 97 % | TEMPERATURE: 98 F | SYSTOLIC BLOOD PRESSURE: 130 MMHG | WEIGHT: 117.07 LBS | HEART RATE: 57 BPM | DIASTOLIC BLOOD PRESSURE: 72 MMHG

## 2019-02-19 VITALS
HEART RATE: 61 BPM | SYSTOLIC BLOOD PRESSURE: 125 MMHG | TEMPERATURE: 98 F | OXYGEN SATURATION: 99 % | RESPIRATION RATE: 18 BRPM | DIASTOLIC BLOOD PRESSURE: 74 MMHG

## 2019-02-19 DIAGNOSIS — Z98.89 OTHER SPECIFIED POSTPROCEDURAL STATES: Chronic | ICD-10-CM

## 2019-02-19 DIAGNOSIS — Z90.3 ACQUIRED ABSENCE OF STOMACH [PART OF]: Chronic | ICD-10-CM

## 2019-02-19 LAB
ALBUMIN SERPL ELPH-MCNC: 3.9 G/DL — SIGNIFICANT CHANGE UP (ref 3.3–5)
ALP SERPL-CCNC: 76 U/L — SIGNIFICANT CHANGE UP (ref 40–120)
ALT FLD-CCNC: 12 U/L — SIGNIFICANT CHANGE UP (ref 10–45)
ANION GAP SERPL CALC-SCNC: 12 MMOL/L — SIGNIFICANT CHANGE UP (ref 5–17)
AST SERPL-CCNC: 12 U/L — SIGNIFICANT CHANGE UP (ref 10–40)
BASOPHILS # BLD AUTO: 0 K/UL — SIGNIFICANT CHANGE UP (ref 0–0.2)
BASOPHILS NFR BLD AUTO: 0.4 % — SIGNIFICANT CHANGE UP (ref 0–2)
BILIRUB SERPL-MCNC: 0.6 MG/DL — SIGNIFICANT CHANGE UP (ref 0.2–1.2)
BUN SERPL-MCNC: 11 MG/DL — SIGNIFICANT CHANGE UP (ref 7–23)
CALCIUM SERPL-MCNC: 9.3 MG/DL — SIGNIFICANT CHANGE UP (ref 8.4–10.5)
CHLORIDE SERPL-SCNC: 99 MMOL/L — SIGNIFICANT CHANGE UP (ref 96–108)
CO2 SERPL-SCNC: 24 MMOL/L — SIGNIFICANT CHANGE UP (ref 22–31)
CREAT SERPL-MCNC: 0.66 MG/DL — SIGNIFICANT CHANGE UP (ref 0.5–1.3)
EOSINOPHIL # BLD AUTO: 0.2 K/UL — SIGNIFICANT CHANGE UP (ref 0–0.5)
EOSINOPHIL NFR BLD AUTO: 1.4 % — SIGNIFICANT CHANGE UP (ref 0–6)
GLUCOSE SERPL-MCNC: 92 MG/DL — SIGNIFICANT CHANGE UP (ref 70–99)
HCT VFR BLD CALC: 35.9 % — SIGNIFICANT CHANGE UP (ref 34.5–45)
HGB BLD-MCNC: 12.1 G/DL — SIGNIFICANT CHANGE UP (ref 11.5–15.5)
LYMPHOCYTES # BLD AUTO: 35 % — SIGNIFICANT CHANGE UP (ref 13–44)
LYMPHOCYTES # BLD AUTO: 4.1 K/UL — HIGH (ref 1–3.3)
MCHC RBC-ENTMCNC: 27.1 PG — SIGNIFICANT CHANGE UP (ref 27–34)
MCHC RBC-ENTMCNC: 33.8 GM/DL — SIGNIFICANT CHANGE UP (ref 32–36)
MCV RBC AUTO: 80.1 FL — SIGNIFICANT CHANGE UP (ref 80–100)
MONOCYTES # BLD AUTO: 0.8 K/UL — SIGNIFICANT CHANGE UP (ref 0–0.9)
MONOCYTES NFR BLD AUTO: 6.8 % — SIGNIFICANT CHANGE UP (ref 2–14)
NEUTROPHILS # BLD AUTO: 6.6 K/UL — SIGNIFICANT CHANGE UP (ref 1.8–7.4)
NEUTROPHILS NFR BLD AUTO: 56.4 % — SIGNIFICANT CHANGE UP (ref 43–77)
PLATELET # BLD AUTO: 303 K/UL — SIGNIFICANT CHANGE UP (ref 150–400)
POTASSIUM SERPL-MCNC: 4.9 MMOL/L — SIGNIFICANT CHANGE UP (ref 3.5–5.3)
POTASSIUM SERPL-SCNC: 4.9 MMOL/L — SIGNIFICANT CHANGE UP (ref 3.5–5.3)
PROT SERPL-MCNC: 6.4 G/DL — SIGNIFICANT CHANGE UP (ref 6–8.3)
RBC # BLD: 4.48 M/UL — SIGNIFICANT CHANGE UP (ref 3.8–5.2)
RBC # FLD: 13.5 % — SIGNIFICANT CHANGE UP (ref 10.3–14.5)
SODIUM SERPL-SCNC: 135 MMOL/L — SIGNIFICANT CHANGE UP (ref 135–145)
WBC # BLD: 11.7 K/UL — HIGH (ref 3.8–10.5)
WBC # FLD AUTO: 11.7 K/UL — HIGH (ref 3.8–10.5)

## 2019-02-19 PROCEDURE — 99284 EMERGENCY DEPT VISIT MOD MDM: CPT | Mod: 25

## 2019-02-19 PROCEDURE — 93010 ELECTROCARDIOGRAM REPORT: CPT

## 2019-02-19 PROCEDURE — 99283 EMERGENCY DEPT VISIT LOW MDM: CPT | Mod: 25

## 2019-02-19 PROCEDURE — 71046 X-RAY EXAM CHEST 2 VIEWS: CPT | Mod: 26

## 2019-02-19 PROCEDURE — 93005 ELECTROCARDIOGRAM TRACING: CPT

## 2019-02-19 PROCEDURE — 85027 COMPLETE CBC AUTOMATED: CPT

## 2019-02-19 PROCEDURE — 80053 COMPREHEN METABOLIC PANEL: CPT

## 2019-02-19 PROCEDURE — 71046 X-RAY EXAM CHEST 2 VIEWS: CPT

## 2019-02-19 RX ORDER — IBUPROFEN 200 MG
400 TABLET ORAL ONCE
Qty: 0 | Refills: 0 | Status: COMPLETED | OUTPATIENT
Start: 2019-02-19 | End: 2019-02-19

## 2019-02-19 RX ADMIN — Medication 400 MILLIGRAM(S): at 14:47

## 2019-02-19 RX ADMIN — Medication 100 MILLIGRAM(S): at 14:49

## 2019-02-19 RX ADMIN — Medication 400 MILLIGRAM(S): at 11:08

## 2019-02-19 NOTE — ED ADULT NURSE NOTE - OBJECTIVE STATEMENT
74 yo F presents to ED A+Ox3 accompanied by family c/o x2 weeks of cough. Pt. reports a cough, family states "she coughs up a little phlegm but it has no color." Pt. denies N/V, abdominal pain, shortness of breath. Pt. reports mild chest congestion and chest discomfort associated with coughing. Pt. also reports "sometimes I sweat."  Pt. given motrin prior to arriving to exam room, pt. reports feeling better and improvement in pain following medication administration. Lungs clear to ascultation in all fields, breathing unlabored on RA. Skin warm dry and of color appropriate for ethnicity. Comfort and safety measures in place.

## 2019-02-19 NOTE — ED PROVIDER NOTE - MUSCULOSKELETAL, MLM
Spine appears normal, range of motion is not limited, no muscle or joint tenderness Spine appears normal, range of motion is not limited, no muscle or joint tenderness. no lower extremity edema

## 2019-02-19 NOTE — ED STATDOCS - OBJECTIVE STATEMENT
74 y/o F w pmhx of HTN on Metroprolol once a day p/w ongoing NP cough (worse at nighttime) [id number] w associated rhinorrhea and fevers initially. +mild diaphoresis. +mild head pain and pressure (present currently). +mild chest pressure and tightness. Denies any leg swelling or other complaints.   Pt endorsing azithromycin (finished course) and steroids Did not endorse Tylenol today.

## 2019-02-19 NOTE — ED PROVIDER NOTE - OBJECTIVE STATEMENT
74 y/o F w pmhx of HTN on Metroprolol once a day p/w ongoing NP cough (worse at nighttime) [id number] w associated rhinorrhea, nasal congestion, and fevers initially. +mild diaphoresis. +mild head pain and pressure (present currently). +mild chest pressure and tightness. Denies any leg swelling or other complaints.   Pt endorsing azithromycin (finished course) and steroids Did not endorse Tylenol today. 74 y/o F w pmhx of HTN on Metroprolol once a day p/w ongoing NP cough (worse at nighttime) [id number] w associated rhinorrhea, nasal congestion, and fevers initially. +mild diaphoresis. +mild chest pressure and tightness.  +mild head pain and pressure (present currently).Denies any leg swelling or other complaints.   Pt endorsing azithromycin (finished course) and steroids Did not endorse Tylenol today.

## 2019-02-19 NOTE — ED PROVIDER NOTE - ENMT, MLM
Airway patent, Nasal mucosa clear. Mouth with normal mucosa. Throat has no vesicles, no oropharyngeal exudates and uvula is midline. Airway patent, Nasal mucosa clear. Mouth with normal mucosa. Throat has no vesicles, no oropharyngeal exudates and uvula is midline. no JVD, no lymphadenopathy

## 2019-02-19 NOTE — ED PROVIDER NOTE - ATTENDING CONTRIBUTION TO CARE
Dr. Galeana (Attending Physician)  I performed a history and physical exam of the patient and discussed their management with the resident. I reviewed the resident's note and agree with the documented findings and plan of care. My medical decision making and observations are found above.

## 2019-02-19 NOTE — ED PROVIDER NOTE - CLINICAL SUMMARY MEDICAL DECISION MAKING FREE TEXT BOX
72 y/o F p/w 3 weeks of rhinorrhea, congestion, cough, chest tightness w coughing, no TRENT, no SOB, no lower extremity swelling, no paroxysmal nocturnal dyspnea. will chest s ray, ekg, has already been treated w azithromycin and steroids w/o improvement likely follow up w pmd for bronchitis if chest x ray and ekg normal.- MD Kervin 74 y/o F p/w 3 weeks of rhinorrhea, congestion, cough, chest tightness w coughing, no TRENT, no SOB, no lower extremity swelling, no paroxysmal nocturnal dyspnea. will chest xray, ekg, has already been treated w azithromycin and steroids w/o improvement likely follow up w pmd for bronchitis if chest x ray and ekg normal.- MD Kervin

## 2019-02-19 NOTE — ED ADULT NURSE NOTE - CHPI ED NUR SYMPTOMS NEG
Patient is a 21 year old  at 39w2d who presented for medical induction secondary to pre eclampsia without severe features.  Proceeded for primary  section due to failure to dilate.  Given 24 hours of antibiotics secondary to suspected chorio with fever.  Uncomplicated post op course. no fever

## 2019-02-19 NOTE — ED PROVIDER NOTE - NSFOLLOWUPINSTRUCTIONS_ED_ALL_ED_FT
1. Follow up with your primary care physician within 2-3days for reevaluation.  2.  Return to the Emergency Department for worsening, progressive or any other concerning symptoms.  3.  Take antibiotics as prescribed.

## 2019-02-27 ENCOUNTER — APPOINTMENT (OUTPATIENT)
Dept: ORTHOPEDIC SURGERY | Facility: CLINIC | Age: 74
End: 2019-02-27

## 2019-03-21 ENCOUNTER — APPOINTMENT (OUTPATIENT)
Dept: RHEUMATOLOGY | Facility: CLINIC | Age: 74
End: 2019-03-21
Payer: MEDICARE

## 2019-03-21 VITALS
SYSTOLIC BLOOD PRESSURE: 131 MMHG | HEART RATE: 56 BPM | DIASTOLIC BLOOD PRESSURE: 74 MMHG | BODY MASS INDEX: 22.09 KG/M2 | HEIGHT: 61 IN | TEMPERATURE: 98.2 F | OXYGEN SATURATION: 98 % | WEIGHT: 117 LBS

## 2019-03-21 PROCEDURE — 96401 CHEMO ANTI-NEOPL SQ/IM: CPT

## 2019-03-21 RX ORDER — DENOSUMAB 60 MG/ML
60 INJECTION SUBCUTANEOUS
Qty: 1 | Refills: 0 | Status: COMPLETED | OUTPATIENT
Start: 2019-03-21

## 2019-03-21 RX ADMIN — DENOSUMAB 0 MG/ML: 60 INJECTION SUBCUTANEOUS at 00:00

## 2019-03-29 ENCOUNTER — EMERGENCY (EMERGENCY)
Facility: HOSPITAL | Age: 74
LOS: 1 days | Discharge: HOME | End: 2019-03-29
Attending: EMERGENCY MEDICINE
Payer: COMMERCIAL

## 2019-03-29 VITALS
HEART RATE: 69 BPM | TEMPERATURE: 98 F | OXYGEN SATURATION: 98 % | RESPIRATION RATE: 18 BRPM | DIASTOLIC BLOOD PRESSURE: 77 MMHG | SYSTOLIC BLOOD PRESSURE: 164 MMHG | HEIGHT: 60 IN | WEIGHT: 117.07 LBS

## 2019-03-29 VITALS
DIASTOLIC BLOOD PRESSURE: 87 MMHG | TEMPERATURE: 98 F | SYSTOLIC BLOOD PRESSURE: 134 MMHG | RESPIRATION RATE: 16 BRPM | OXYGEN SATURATION: 100 % | HEART RATE: 71 BPM

## 2019-03-29 DIAGNOSIS — Z98.89 OTHER SPECIFIED POSTPROCEDURAL STATES: Chronic | ICD-10-CM

## 2019-03-29 DIAGNOSIS — Z90.3 ACQUIRED ABSENCE OF STOMACH [PART OF]: Chronic | ICD-10-CM

## 2019-03-29 LAB
ALBUMIN SERPL ELPH-MCNC: 4.1 G/DL — SIGNIFICANT CHANGE UP (ref 3.3–5)
ALP SERPL-CCNC: 85 U/L — SIGNIFICANT CHANGE UP (ref 40–120)
ALT FLD-CCNC: 15 U/L — SIGNIFICANT CHANGE UP (ref 10–45)
ANION GAP SERPL CALC-SCNC: 11 MMOL/L — SIGNIFICANT CHANGE UP (ref 5–17)
AST SERPL-CCNC: 24 U/L — SIGNIFICANT CHANGE UP (ref 10–40)
BASOPHILS # BLD AUTO: 0 K/UL — SIGNIFICANT CHANGE UP (ref 0–0.2)
BASOPHILS NFR BLD AUTO: 0.2 % — SIGNIFICANT CHANGE UP (ref 0–2)
BILIRUB SERPL-MCNC: 1 MG/DL — SIGNIFICANT CHANGE UP (ref 0.2–1.2)
BUN SERPL-MCNC: 8 MG/DL — SIGNIFICANT CHANGE UP (ref 7–23)
CALCIUM SERPL-MCNC: 8.6 MG/DL — SIGNIFICANT CHANGE UP (ref 8.4–10.5)
CHLORIDE SERPL-SCNC: 100 MMOL/L — SIGNIFICANT CHANGE UP (ref 96–108)
CO2 SERPL-SCNC: 22 MMOL/L — SIGNIFICANT CHANGE UP (ref 22–31)
CREAT SERPL-MCNC: 0.54 MG/DL — SIGNIFICANT CHANGE UP (ref 0.5–1.3)
EOSINOPHIL # BLD AUTO: 0.1 K/UL — SIGNIFICANT CHANGE UP (ref 0–0.5)
EOSINOPHIL NFR BLD AUTO: 0.6 % — SIGNIFICANT CHANGE UP (ref 0–6)
GLUCOSE SERPL-MCNC: 124 MG/DL — HIGH (ref 70–99)
HCT VFR BLD CALC: 35 % — SIGNIFICANT CHANGE UP (ref 34.5–45)
HGB BLD-MCNC: 12.4 G/DL — SIGNIFICANT CHANGE UP (ref 11.5–15.5)
LIDOCAIN IGE QN: 17 U/L — SIGNIFICANT CHANGE UP (ref 7–60)
LYMPHOCYTES # BLD AUTO: 2 K/UL — SIGNIFICANT CHANGE UP (ref 1–3.3)
LYMPHOCYTES # BLD AUTO: 20 % — SIGNIFICANT CHANGE UP (ref 13–44)
MCHC RBC-ENTMCNC: 28.6 PG — SIGNIFICANT CHANGE UP (ref 27–34)
MCHC RBC-ENTMCNC: 35.3 GM/DL — SIGNIFICANT CHANGE UP (ref 32–36)
MCV RBC AUTO: 81 FL — SIGNIFICANT CHANGE UP (ref 80–100)
MONOCYTES # BLD AUTO: 0.6 K/UL — SIGNIFICANT CHANGE UP (ref 0–0.9)
MONOCYTES NFR BLD AUTO: 6.3 % — SIGNIFICANT CHANGE UP (ref 2–14)
NEUTROPHILS # BLD AUTO: 7.3 K/UL — SIGNIFICANT CHANGE UP (ref 1.8–7.4)
NEUTROPHILS NFR BLD AUTO: 72.8 % — SIGNIFICANT CHANGE UP (ref 43–77)
PLATELET # BLD AUTO: 216 K/UL — SIGNIFICANT CHANGE UP (ref 150–400)
POTASSIUM SERPL-MCNC: 4.4 MMOL/L — SIGNIFICANT CHANGE UP (ref 3.5–5.3)
POTASSIUM SERPL-SCNC: 4.4 MMOL/L — SIGNIFICANT CHANGE UP (ref 3.5–5.3)
PROT SERPL-MCNC: 7 G/DL — SIGNIFICANT CHANGE UP (ref 6–8.3)
RBC # BLD: 4.32 M/UL — SIGNIFICANT CHANGE UP (ref 3.8–5.2)
RBC # FLD: 13.3 % — SIGNIFICANT CHANGE UP (ref 10.3–14.5)
SODIUM SERPL-SCNC: 133 MMOL/L — LOW (ref 135–145)
WBC # BLD: 10 K/UL — SIGNIFICANT CHANGE UP (ref 3.8–10.5)
WBC # FLD AUTO: 10 K/UL — SIGNIFICANT CHANGE UP (ref 3.8–10.5)

## 2019-03-29 PROCEDURE — 99284 EMERGENCY DEPT VISIT MOD MDM: CPT | Mod: 25

## 2019-03-29 PROCEDURE — 80053 COMPREHEN METABOLIC PANEL: CPT

## 2019-03-29 PROCEDURE — 83690 ASSAY OF LIPASE: CPT

## 2019-03-29 PROCEDURE — 96365 THER/PROPH/DIAG IV INF INIT: CPT

## 2019-03-29 PROCEDURE — 85027 COMPLETE CBC AUTOMATED: CPT

## 2019-03-29 PROCEDURE — 96375 TX/PRO/DX INJ NEW DRUG ADDON: CPT

## 2019-03-29 RX ORDER — IBUPROFEN 200 MG
1 TABLET ORAL
Qty: 120 | Refills: 0
Start: 2019-03-29 | End: 2019-04-27

## 2019-03-29 RX ORDER — ONDANSETRON 8 MG/1
4 TABLET, FILM COATED ORAL ONCE
Qty: 0 | Refills: 0 | Status: COMPLETED | OUTPATIENT
Start: 2019-03-29 | End: 2019-03-29

## 2019-03-29 RX ORDER — ACETAMINOPHEN 500 MG
1000 TABLET ORAL ONCE
Qty: 0 | Refills: 0 | Status: COMPLETED | OUTPATIENT
Start: 2019-03-29 | End: 2019-03-29

## 2019-03-29 RX ORDER — FAMOTIDINE 10 MG/ML
20 INJECTION INTRAVENOUS ONCE
Qty: 0 | Refills: 0 | Status: COMPLETED | OUTPATIENT
Start: 2019-03-29 | End: 2019-03-29

## 2019-03-29 RX ORDER — CEPHALEXIN 500 MG
500 CAPSULE ORAL ONCE
Qty: 0 | Refills: 0 | Status: COMPLETED | OUTPATIENT
Start: 2019-03-29 | End: 2019-03-29

## 2019-03-29 RX ORDER — ONDANSETRON 8 MG/1
1 TABLET, FILM COATED ORAL
Qty: 9 | Refills: 0 | OUTPATIENT
Start: 2019-03-29 | End: 2019-03-31

## 2019-03-29 RX ORDER — SODIUM CHLORIDE 9 MG/ML
1000 INJECTION INTRAMUSCULAR; INTRAVENOUS; SUBCUTANEOUS ONCE
Qty: 0 | Refills: 0 | Status: COMPLETED | OUTPATIENT
Start: 2019-03-29 | End: 2019-03-29

## 2019-03-29 RX ADMIN — Medication 1000 MILLIGRAM(S): at 07:03

## 2019-03-29 RX ADMIN — FAMOTIDINE 20 MILLIGRAM(S): 10 INJECTION INTRAVENOUS at 06:29

## 2019-03-29 RX ADMIN — Medication 400 MILLIGRAM(S): at 06:29

## 2019-03-29 RX ADMIN — SODIUM CHLORIDE 1000 MILLILITER(S): 9 INJECTION INTRAMUSCULAR; INTRAVENOUS; SUBCUTANEOUS at 06:28

## 2019-03-29 RX ADMIN — ONDANSETRON 4 MILLIGRAM(S): 8 TABLET, FILM COATED ORAL at 06:29

## 2019-03-29 RX ADMIN — Medication 500 MILLIGRAM(S): at 07:02

## 2019-03-29 NOTE — ED PROVIDER NOTE - CARE PLAN
Principal Discharge DX:	Nausea and vomiting  Assessment and plan of treatment:	Thank you for visiting our Emergency Department, it has been a pleasure taking part in your healthcare.    Your discharge diagnosis is: nausea and vomiting  Please take all discharge medications as indicated below:  Zofran 4mg ODT every 8 hours as needed for nausea   Motrin 600mg, every 6 hours as needed for pain  Please follow up with your PMD within x48 hours.  Please follow up with Gen Surgery within x48 hours.  A copy of resulted labs, imaging, and findings have been provided to you.   You have had a detailed discussion with your provider regarding your diagnosis, care management and discharge planning including, but not limited to: return precautions, follow up visits with existing or new providers, new prescriptions and/or medication changes, wound and/or splint/cast care or other care   aspects specific to your diagnosis and treatment. You have been given the opportunity to have your questions answered. At this time you have been deemed stable and fit for discharge.  Return precautions to the Emergency Department include but are not limited to: unrelenting nausea, vomiting, fever, chills, chest pain, shortness of breath, dizziness, chest or abdominal pain, worsening back pain, syncope, blood in urine or stool, headache that doesn't resolve, numbness or tingling, loss of sensation, loss of motor function, or any other concerning symptoms.

## 2019-03-29 NOTE — ED ADULT NURSE REASSESSMENT NOTE - NS ED NURSE REASSESS COMMENT FT1
Pt denies nausea at this time and states "my pain is gone." Pt medicated as per MD order with PO ABX. Educated pt on plan of care. Safety and comfort maintained.

## 2019-03-29 NOTE — ED PROVIDER NOTE - OBJECTIVE STATEMENT
71F hx partial gastrectomy in 2016, htn, L foot bunion surgery 2 days ago (3/27) at [facility], p/w intractable n/v since being discharged from the hospital. Pt was sent home on kefflex, hydrocodone, and meloxicam but has been unable to tolerate PO since getting home and therefore hasn't taken any of her DC meds. Pt denies f/c, abdominal pain, cp/sob. 73F hx partial gastrectomy in 2016, htn, L foot bunion surgery 2 days ago (3/27) at [facility], p/w intractable n/v since being discharged from the hospital. Pt was sent home on kefflex, hydrocodone, and meloxicam but has been unable to tolerate PO since getting home and therefore hasn't taken any of her DC meds. Pt denies f/c, abdominal pain, cp/sob.

## 2019-03-29 NOTE — ED PROVIDER NOTE - PLAN OF CARE
Thank you for visiting our Emergency Department, it has been a pleasure taking part in your healthcare.    Your discharge diagnosis is: nausea and vomiting  Please take all discharge medications as indicated below:  Zofran 4mg ODT every 8 hours as needed for nausea   Motrin 600mg, every 6 hours as needed for pain  Please follow up with your PMD within x48 hours.  Please follow up with Gen Surgery within x48 hours.  A copy of resulted labs, imaging, and findings have been provided to you.   You have had a detailed discussion with your provider regarding your diagnosis, care management and discharge planning including, but not limited to: return precautions, follow up visits with existing or new providers, new prescriptions and/or medication changes, wound and/or splint/cast care or other care   aspects specific to your diagnosis and treatment. You have been given the opportunity to have your questions answered. At this time you have been deemed stable and fit for discharge.  Return precautions to the Emergency Department include but are not limited to: unrelenting nausea, vomiting, fever, chills, chest pain, shortness of breath, dizziness, chest or abdominal pain, worsening back pain, syncope, blood in urine or stool, headache that doesn't resolve, numbness or tingling, loss of sensation, loss of motor function, or any other concerning symptoms.

## 2019-03-29 NOTE — ED ADULT NURSE NOTE - CHIEF COMPLAINT QUOTE
Pt c/o vomiting.  Pt 1 day post op L foot sx and has had 5 episodes of vomiting.  Pt states "the medication is not making me feel well".

## 2019-03-29 NOTE — ED ADULT NURSE NOTE - OBJECTIVE STATEMENT
Pt is 73 Y A&O x3 F with hx of HTN presenting to ED with c/o vomiting and 5/10 "burning" epigastric pain "when I vomit" since last night. Pt states she had surgery yesterday on the L foot for "a bunion." Pt states "I was vomiting in the hospital and they sent me home." Pt states she has been taking hydrocodone and cephalexin s/p surgery. Pt denies SOB, CP, blood in vomit, diarrhea, Pt is 73 Y A&O x3 F with hx of HTN presenting to ED with c/o vomiting and 5/10 "burning" epigastric pain "when I vomit" since last night. Pt states she had surgery yesterday on the L foot for "a bunion." Pt states "I was vomiting in the hospital and they sent me home." Pt states she has been taking hydrocodone and cephalexin s/p surgery but has been unable to tolerate the PO medications. Pt denies SOB, CP, blood in vomit, diarrhea. Pt is breathing unlabored on RA. VSS. Abdomen is soft and non-distended. Tenderness noted to epigastrium upon palpation. Skin is warm and dry. Pt left foot is in a soft boot. Pt states she is ambulatory without any assistive devices. + peripheral pulses. Safety and comfort maintained.

## 2019-03-29 NOTE — ED ADULT NURSE REASSESSMENT NOTE - NS ED NURSE REASSESS COMMENT FT1
Patient appears to be resting comfortably in stretcher. Patient denies any chest pain, dizziness, n/v/d, numbness, tingling, SOB. Patient tolerating PO intake at this time. A&OX3. Safety and comfort measures provided.

## 2019-03-29 NOTE — ED ADULT NURSE REASSESSMENT NOTE - NS ED NURSE REASSESS COMMENT FT1
Pt breathing unlabored on RA. As per MD Dhaliwal, wait approx 15-20 minutes to see if pt responds well to medications before administering ABX as per MD order. Educated pt on plan of care. Safety and comfort maintained. Warm blanket provided. Call bell within reach.

## 2019-03-29 NOTE — ED PROVIDER NOTE - PROGRESS NOTE DETAILS
Piedad PGY2: Pt assessed at beside. Pt resting comfortably, pain controlled, pt questions answered. Vital signs stable. Reports feels better after medications, tolerating PO, feels well asking to be dc'd. Instructed to fu w pmd and surgeon for post op fu. Will d/c with PMD f/u, strict return precautions given with read back per pt/family/caregiver.

## 2019-03-29 NOTE — ED PROVIDER NOTE - CLINICAL SUMMARY MEDICAL DECISION MAKING FREE TEXT BOX
73F hx partial gastrectomy, recent bunion surgery p/w intractable vomiting since being DC'd. Abdomen soft nt nd on exam.  VSWNL. Plan for labs, fluids, zofran, GI cocktail, reassess. 73F hx partial gastrectomy, recent bunion surgery p/w intractable vomiting since being DC'd. Abdomen soft nt nd on exam.  VSWNL. Plan for labs, fluids, zofran, GI cocktail, reassess.  Gisselle: 73 year old female s/p left bunion surgery 2 days ago, presents with n/v since discharge. no abdominal pain, not able to take pain meds. will get labs, ivf, nausea control, reassess

## 2019-03-29 NOTE — ED ADULT NURSE NOTE - NS_ED_NURSE_TEACHING_TOPIC_ED_A_ED
Medications/Other specify/Orthopedic/Follow up with pcp and general surgery. Return for worsening s/s.

## 2019-03-29 NOTE — ED PROVIDER NOTE - NS ED ATTENDING STATEMENT MOD
I have personally seen and examined this patient.  I have fully participated in the care of this patient. I have reviewed all pertinent clinical information, including history, physical exam, plan and the Resident’s note and agree except as noted. [place] [phone]

## 2019-03-29 NOTE — ED ADULT NURSE NOTE - CHPI ED NUR SYMPTOMS NEG
no diarrhea/no abdominal distension/no chills/no dysuria/no fever/no hematuria/no blood in stool/no burning urination

## 2019-04-12 NOTE — ED ADULT NURSE NOTE - DOES PATIENT HAVE ADVANCE DIRECTIVE
Glendyhui Ng is a 19 unk

## 2019-06-25 ENCOUNTER — NON-APPOINTMENT (OUTPATIENT)
Age: 74
End: 2019-06-25

## 2019-06-25 ENCOUNTER — APPOINTMENT (OUTPATIENT)
Dept: CARDIOLOGY | Facility: CLINIC | Age: 74
End: 2019-06-25
Payer: MEDICARE

## 2019-06-25 VITALS
DIASTOLIC BLOOD PRESSURE: 77 MMHG | BODY MASS INDEX: 22.09 KG/M2 | HEIGHT: 61 IN | HEART RATE: 59 BPM | WEIGHT: 117 LBS | OXYGEN SATURATION: 98 % | SYSTOLIC BLOOD PRESSURE: 162 MMHG

## 2019-06-25 PROCEDURE — 99204 OFFICE O/P NEW MOD 45 MIN: CPT

## 2019-06-25 PROCEDURE — 93000 ELECTROCARDIOGRAM COMPLETE: CPT

## 2019-06-25 RX ORDER — BACLOFEN 10 MG/1
10 TABLET ORAL 3 TIMES DAILY
Qty: 30 | Refills: 0 | Status: DISCONTINUED | COMMUNITY
Start: 2019-01-23 | End: 2019-06-25

## 2019-06-25 RX ORDER — LORATADINE 10 MG/1
10 TABLET ORAL
Qty: 30 | Refills: 4 | Status: DISCONTINUED | COMMUNITY
Start: 2017-02-14 | End: 2019-06-25

## 2019-07-11 NOTE — DISCUSSION/SUMMARY
[FreeTextEntry1] : Ms. Anthony is a 74 year-old woman with dyspnea.\par \par Plan:\par 1. Given the dyspnea, will consider TTE and stress test to determine etiology.\par 2. OMT and BP control. \par 3. Old records requested and reviewed with performing physician.\par 4. Primary and secondary prevention of cardiovascular and related conditions discussed at length, including but not limited to diet and lifestyle modification.\par 5. Patient to return to the office in 1-3 months.\par \par Thank you for allowing me to participate in the care of your patient. If you have any questions, please feel free to contact me at [phone] or via email at [email or web address].\par \par Sincerely,\par \par Esther Edwards MD FAC

## 2019-07-11 NOTE — PHYSICAL EXAM
[General Appearance - Well Developed] : well developed [Normal Appearance] : normal appearance [Well Groomed] : well groomed [General Appearance - Well Nourished] : well nourished [No Deformities] : no deformities [General Appearance - In No Acute Distress] : no acute distress [Normal Conjunctiva] : the conjunctiva exhibited no abnormalities [Eyelids - No Xanthelasma] : the eyelids demonstrated no xanthelasmas [Normal Oral Mucosa] : normal oral mucosa [No Oral Pallor] : no oral pallor [No Oral Cyanosis] : no oral cyanosis [Normal Jugular Venous A Waves Present] : normal jugular venous A waves present [Normal Jugular Venous V Waves Present] : normal jugular venous V waves present [No Jugular Venous Arriola A Waves] : no jugular venous arriola A waves [Heart Sounds] : normal S1 and S2 [Murmurs] : no murmurs present [Heart Rate And Rhythm] : heart rate and rhythm were normal [Exaggerated Use Of Accessory Muscles For Inspiration] : no accessory muscle use [Respiration, Rhythm And Depth] : normal respiratory rhythm and effort [Auscultation Breath Sounds / Voice Sounds] : lungs were clear to auscultation bilaterally [Abdomen Soft] : soft [Abdomen Mass (___ Cm)] : no abdominal mass palpated [Abdomen Tenderness] : non-tender [Gait - Sufficient For Exercise Testing] : the gait was sufficient for exercise testing [Abnormal Walk] : normal gait [Nail Clubbing] : no clubbing of the fingernails [Petechial Hemorrhages (___cm)] : no petechial hemorrhages [Cyanosis, Localized] : no localized cyanosis [Skin Color & Pigmentation] : normal skin color and pigmentation [No Venous Stasis] : no venous stasis [] : no rash [Skin Lesions] : no skin lesions [Oriented To Time, Place, And Person] : oriented to person, place, and time [No Skin Ulcers] : no skin ulcer [No Xanthoma] : no  xanthoma was observed [No Anxiety] : not feeling anxious [Mood] : the mood was normal [Affect] : the affect was normal

## 2019-07-11 NOTE — HISTORY OF PRESENT ILLNESS
[FreeTextEntry1] : Ms. Hinson is a 74 year-old woman with known dyspnea and angina who notes worsenign of symptoms at time. She otherwise feels well without complaints. The symptoms are at times exertional however at other times, not.

## 2019-07-12 ENCOUNTER — APPOINTMENT (OUTPATIENT)
Dept: RHEUMATOLOGY | Facility: CLINIC | Age: 74
End: 2019-07-12
Payer: MEDICARE

## 2019-07-12 VITALS
WEIGHT: 124 LBS | BODY MASS INDEX: 23.43 KG/M2 | HEART RATE: 67 BPM | SYSTOLIC BLOOD PRESSURE: 130 MMHG | TEMPERATURE: 98.6 F | OXYGEN SATURATION: 97 % | DIASTOLIC BLOOD PRESSURE: 78 MMHG

## 2019-07-12 PROCEDURE — 99213 OFFICE O/P EST LOW 20 MIN: CPT

## 2019-07-13 LAB
25(OH)D3 SERPL-MCNC: 29.4 NG/ML
ALBUMIN SERPL ELPH-MCNC: 4.5 G/DL
ALP BLD-CCNC: 69 U/L
ALT SERPL-CCNC: 8 U/L
ANION GAP SERPL CALC-SCNC: 11 MMOL/L
AST SERPL-CCNC: 16 U/L
BASOPHILS # BLD AUTO: 0.03 K/UL
BASOPHILS NFR BLD AUTO: 0.4 %
BILIRUB SERPL-MCNC: 0.7 MG/DL
BUN SERPL-MCNC: 14 MG/DL
CALCIUM SERPL-MCNC: 10.1 MG/DL
CHLORIDE SERPL-SCNC: 103 MMOL/L
CO2 SERPL-SCNC: 25 MMOL/L
CREAT SERPL-MCNC: 0.83 MG/DL
CRP SERPL-MCNC: 0.14 MG/DL
EOSINOPHIL # BLD AUTO: 0.22 K/UL
EOSINOPHIL NFR BLD AUTO: 2.8 %
ERYTHROCYTE [SEDIMENTATION RATE] IN BLOOD BY WESTERGREN METHOD: 13 MM/HR
GLUCOSE SERPL-MCNC: 87 MG/DL
HCT VFR BLD CALC: 39.3 %
HGB BLD-MCNC: 12.1 G/DL
IMM GRANULOCYTES NFR BLD AUTO: 0.3 %
LYMPHOCYTES # BLD AUTO: 2.95 K/UL
LYMPHOCYTES NFR BLD AUTO: 36.9 %
MAN DIFF?: NORMAL
MCHC RBC-ENTMCNC: 26.1 PG
MCHC RBC-ENTMCNC: 30.8 GM/DL
MCV RBC AUTO: 84.9 FL
MONOCYTES # BLD AUTO: 0.55 K/UL
MONOCYTES NFR BLD AUTO: 6.9 %
NEUTROPHILS # BLD AUTO: 4.22 K/UL
NEUTROPHILS NFR BLD AUTO: 52.7 %
PLATELET # BLD AUTO: 272 K/UL
POTASSIUM SERPL-SCNC: 4.4 MMOL/L
PROT SERPL-MCNC: 7.2 G/DL
RBC # BLD: 4.63 M/UL
RBC # FLD: 15 %
SODIUM SERPL-SCNC: 139 MMOL/L
WBC # FLD AUTO: 7.99 K/UL

## 2019-07-23 ENCOUNTER — OUTPATIENT (OUTPATIENT)
Dept: OUTPATIENT SERVICES | Facility: HOSPITAL | Age: 74
LOS: 1 days | End: 2019-07-23
Payer: COMMERCIAL

## 2019-07-23 ENCOUNTER — APPOINTMENT (OUTPATIENT)
Dept: CV DIAGNOSITCS | Facility: HOSPITAL | Age: 74
End: 2019-07-23

## 2019-07-23 ENCOUNTER — APPOINTMENT (OUTPATIENT)
Dept: CV DIAGNOSTICS | Facility: HOSPITAL | Age: 74
End: 2019-07-23

## 2019-07-23 DIAGNOSIS — Z90.3 ACQUIRED ABSENCE OF STOMACH [PART OF]: Chronic | ICD-10-CM

## 2019-07-23 DIAGNOSIS — I25.10 ATHEROSCLEROTIC HEART DISEASE OF NATIVE CORONARY ARTERY WITHOUT ANGINA PECTORIS: ICD-10-CM

## 2019-07-23 DIAGNOSIS — Z98.89 OTHER SPECIFIED POSTPROCEDURAL STATES: Chronic | ICD-10-CM

## 2019-07-23 DIAGNOSIS — R06.02 SHORTNESS OF BREATH: ICD-10-CM

## 2019-07-23 PROCEDURE — 93018 CV STRESS TEST I&R ONLY: CPT

## 2019-07-23 PROCEDURE — 93016 CV STRESS TEST SUPVJ ONLY: CPT

## 2019-07-23 PROCEDURE — 93306 TTE W/DOPPLER COMPLETE: CPT

## 2019-07-23 PROCEDURE — 93017 CV STRESS TEST TRACING ONLY: CPT

## 2019-07-23 PROCEDURE — 93306 TTE W/DOPPLER COMPLETE: CPT | Mod: 26

## 2019-08-13 ENCOUNTER — APPOINTMENT (OUTPATIENT)
Dept: CARDIOLOGY | Facility: CLINIC | Age: 74
End: 2019-08-13
Payer: MEDICARE

## 2019-08-13 VITALS
SYSTOLIC BLOOD PRESSURE: 149 MMHG | DIASTOLIC BLOOD PRESSURE: 77 MMHG | OXYGEN SATURATION: 99 % | HEART RATE: 64 BPM | HEIGHT: 61 IN

## 2019-08-13 VITALS — BODY MASS INDEX: 23.43 KG/M2 | WEIGHT: 124 LBS

## 2019-08-13 PROCEDURE — 99214 OFFICE O/P EST MOD 30 MIN: CPT

## 2019-08-13 PROCEDURE — 93000 ELECTROCARDIOGRAM COMPLETE: CPT

## 2019-08-13 NOTE — DISCUSSION/SUMMARY
[FreeTextEntry1] : Ms. Anthony is a 74 year-old woman with dyspnea.\par \par Plan:\par 1. Given the dyspnea and benign stress/echo - medical management and exercise.\par 2. OMT and BP control. \par 3. Old records requested and reviewed with performing physician.\par 4. Primary and secondary prevention of cardiovascular and related conditions discussed at length, including but not limited to diet and lifestyle modification.\par 5. Patient to return to the office in 1-3 months.\par \par Thank you for allowing me to participate in the care of your patient. If you have any questions, please feel free to contact me at [phone] or via email at [email or web address].\par \par Sincerely,\par \par Esther Edwards MD FAC

## 2019-08-13 NOTE — HISTORY OF PRESENT ILLNESS
[FreeTextEntry1] : Ms. Hinson is a 74 year-old woman with known dyspnea and angina who notes worsening of symptoms at time. She otherwise feels well without complaints. The symptoms are at times exertional however at other times, not. She underwent a stress and echo, which were benign.

## 2019-08-13 NOTE — PHYSICAL EXAM
[General Appearance - Well Developed] : well developed [Normal Appearance] : normal appearance [Well Groomed] : well groomed [General Appearance - Well Nourished] : well nourished [No Deformities] : no deformities [General Appearance - In No Acute Distress] : no acute distress [Normal Conjunctiva] : the conjunctiva exhibited no abnormalities [Eyelids - No Xanthelasma] : the eyelids demonstrated no xanthelasmas [Normal Oral Mucosa] : normal oral mucosa [No Oral Pallor] : no oral pallor [No Oral Cyanosis] : no oral cyanosis [Normal Jugular Venous A Waves Present] : normal jugular venous A waves present [Normal Jugular Venous V Waves Present] : normal jugular venous V waves present [No Jugular Venous Arriola A Waves] : no jugular venous arriola A waves [Respiration, Rhythm And Depth] : normal respiratory rhythm and effort [Auscultation Breath Sounds / Voice Sounds] : lungs were clear to auscultation bilaterally [Exaggerated Use Of Accessory Muscles For Inspiration] : no accessory muscle use [Heart Rate And Rhythm] : heart rate and rhythm were normal [Heart Sounds] : normal S1 and S2 [Murmurs] : no murmurs present [Abdomen Soft] : soft [Abdomen Tenderness] : non-tender [Abdomen Mass (___ Cm)] : no abdominal mass palpated [Gait - Sufficient For Exercise Testing] : the gait was sufficient for exercise testing [Abnormal Walk] : normal gait [Nail Clubbing] : no clubbing of the fingernails [Cyanosis, Localized] : no localized cyanosis [Petechial Hemorrhages (___cm)] : no petechial hemorrhages [] : no rash [Skin Color & Pigmentation] : normal skin color and pigmentation [No Venous Stasis] : no venous stasis [Skin Lesions] : no skin lesions [No Skin Ulcers] : no skin ulcer [No Xanthoma] : no  xanthoma was observed [Oriented To Time, Place, And Person] : oriented to person, place, and time [Affect] : the affect was normal [Mood] : the mood was normal [No Anxiety] : not feeling anxious

## 2019-08-19 ENCOUNTER — APPOINTMENT (OUTPATIENT)
Dept: RHEUMATOLOGY | Facility: CLINIC | Age: 74
End: 2019-08-19
Payer: MEDICARE

## 2019-08-19 VITALS
OXYGEN SATURATION: 98 % | HEIGHT: 61 IN | BODY MASS INDEX: 23.22 KG/M2 | SYSTOLIC BLOOD PRESSURE: 120 MMHG | HEART RATE: 66 BPM | TEMPERATURE: 98.4 F | WEIGHT: 123 LBS | DIASTOLIC BLOOD PRESSURE: 65 MMHG

## 2019-08-19 DIAGNOSIS — Z00.00 ENCOUNTER FOR GENERAL ADULT MEDICAL EXAMINATION W/OUT ABNORMAL FINDINGS: ICD-10-CM

## 2019-08-19 PROCEDURE — 99213 OFFICE O/P EST LOW 20 MIN: CPT | Mod: 25

## 2019-08-19 PROCEDURE — 20610 DRAIN/INJ JOINT/BURSA W/O US: CPT | Mod: 50

## 2019-08-19 RX ORDER — HYLAN G-F 20 16MG/2ML
48 SYRINGE (ML) INTRAARTICULAR
Refills: 0 | Status: COMPLETED | OUTPATIENT
Start: 2019-08-19

## 2019-08-19 RX ADMIN — Medication 0 MG/6ML: at 00:00

## 2019-09-18 ENCOUNTER — APPOINTMENT (OUTPATIENT)
Dept: RHEUMATOLOGY | Facility: CLINIC | Age: 74
End: 2019-09-18

## 2019-09-25 ENCOUNTER — APPOINTMENT (OUTPATIENT)
Dept: RHEUMATOLOGY | Facility: CLINIC | Age: 74
End: 2019-09-25

## 2019-10-01 ENCOUNTER — APPOINTMENT (OUTPATIENT)
Dept: CARDIOLOGY | Facility: CLINIC | Age: 74
End: 2019-10-01
Payer: MEDICARE

## 2019-10-01 VITALS
SYSTOLIC BLOOD PRESSURE: 154 MMHG | WEIGHT: 122 LBS | OXYGEN SATURATION: 98 % | HEART RATE: 66 BPM | BODY MASS INDEX: 23.03 KG/M2 | HEIGHT: 61 IN | DIASTOLIC BLOOD PRESSURE: 78 MMHG

## 2019-10-01 DIAGNOSIS — R06.02 SHORTNESS OF BREATH: ICD-10-CM

## 2019-10-01 DIAGNOSIS — E78.5 HYPERLIPIDEMIA, UNSPECIFIED: ICD-10-CM

## 2019-10-01 DIAGNOSIS — I10 ESSENTIAL (PRIMARY) HYPERTENSION: ICD-10-CM

## 2019-10-01 PROCEDURE — 93000 ELECTROCARDIOGRAM COMPLETE: CPT

## 2019-10-01 PROCEDURE — 99214 OFFICE O/P EST MOD 30 MIN: CPT

## 2019-10-01 RX ORDER — ATORVASTATIN CALCIUM 40 MG/1
40 TABLET, FILM COATED ORAL DAILY
Qty: 30 | Refills: 11 | Status: ACTIVE | COMMUNITY
Start: 2019-10-01 | End: 1900-01-01

## 2019-10-01 RX ORDER — METOPROLOL SUCCINATE 50 MG/1
50 TABLET, EXTENDED RELEASE ORAL DAILY
Qty: 30 | Refills: 11 | Status: ACTIVE | COMMUNITY
Start: 2019-10-01 | End: 1900-01-01

## 2019-10-07 ENCOUNTER — APPOINTMENT (OUTPATIENT)
Dept: RHEUMATOLOGY | Facility: CLINIC | Age: 74
End: 2019-10-07
Payer: MEDICARE

## 2019-10-07 VITALS
WEIGHT: 120 LBS | BODY MASS INDEX: 22.67 KG/M2 | SYSTOLIC BLOOD PRESSURE: 137 MMHG | DIASTOLIC BLOOD PRESSURE: 79 MMHG | TEMPERATURE: 98.4 F | HEART RATE: 58 BPM

## 2019-10-07 PROCEDURE — 96401 CHEMO ANTI-NEOPL SQ/IM: CPT

## 2019-10-07 PROCEDURE — 99213 OFFICE O/P EST LOW 20 MIN: CPT | Mod: 25

## 2019-10-08 ENCOUNTER — MED ADMIN CHARGE (OUTPATIENT)
Age: 74
End: 2019-10-08

## 2019-10-08 RX ORDER — DENOSUMAB 60 MG/ML
60 INJECTION SUBCUTANEOUS
Qty: 1 | Refills: 0 | Status: COMPLETED | OUTPATIENT
Start: 2019-10-08

## 2019-10-08 RX ADMIN — DENOSUMAB 0 MG/ML: 60 INJECTION SUBCUTANEOUS at 00:00

## 2019-11-16 PROBLEM — E78.5 HYPERLIPIDEMIA: Status: ACTIVE | Noted: 2019-10-01

## 2019-11-16 PROBLEM — I10 BENIGN ESSENTIAL HYPERTENSION: Status: ACTIVE | Noted: 2019-08-13

## 2019-11-16 PROBLEM — R06.02 SOB (SHORTNESS OF BREATH): Status: ACTIVE | Noted: 2017-03-20

## 2019-11-16 NOTE — PHYSICAL EXAM
[General Appearance - Well Developed] : well developed [Normal Appearance] : normal appearance [Well Groomed] : well groomed [General Appearance - Well Nourished] : well nourished [No Deformities] : no deformities [General Appearance - In No Acute Distress] : no acute distress [Normal Conjunctiva] : the conjunctiva exhibited no abnormalities [Normal Oral Mucosa] : normal oral mucosa [Eyelids - No Xanthelasma] : the eyelids demonstrated no xanthelasmas [No Oral Pallor] : no oral pallor [No Oral Cyanosis] : no oral cyanosis [Normal Jugular Venous A Waves Present] : normal jugular venous A waves present [No Jugular Venous Arriola A Waves] : no jugular venous arriola A waves [Normal Jugular Venous V Waves Present] : normal jugular venous V waves present [Respiration, Rhythm And Depth] : normal respiratory rhythm and effort [Exaggerated Use Of Accessory Muscles For Inspiration] : no accessory muscle use [Auscultation Breath Sounds / Voice Sounds] : lungs were clear to auscultation bilaterally [Heart Rate And Rhythm] : heart rate and rhythm were normal [Heart Sounds] : normal S1 and S2 [Murmurs] : no murmurs present [Abdomen Tenderness] : non-tender [Abdomen Soft] : soft [Abdomen Mass (___ Cm)] : no abdominal mass palpated [Abnormal Walk] : normal gait [Gait - Sufficient For Exercise Testing] : the gait was sufficient for exercise testing [Nail Clubbing] : no clubbing of the fingernails [Cyanosis, Localized] : no localized cyanosis [Petechial Hemorrhages (___cm)] : no petechial hemorrhages [Skin Color & Pigmentation] : normal skin color and pigmentation [No Venous Stasis] : no venous stasis [] : no rash [Skin Lesions] : no skin lesions [No Xanthoma] : no  xanthoma was observed [No Skin Ulcers] : no skin ulcer [Oriented To Time, Place, And Person] : oriented to person, place, and time [Mood] : the mood was normal [Affect] : the affect was normal [No Anxiety] : not feeling anxious

## 2019-11-16 NOTE — DISCUSSION/SUMMARY
[FreeTextEntry1] : Ms. Anthony is a 74 year-old woman with dyspnea.\par \par Plan:\par 1. Given the dyspnea and benign stress/echo - medical management and exercise.\par 2. OMT and BP control. \par 3. Old records requested and reviewed with performing physician.\par 4. Primary and secondary prevention of cardiovascular and related conditions discussed at length, including but not limited to diet and lifestyle modification.\par 5. Patient to return to the office in 1-3 months.\par \par Thank you for allowing me to participate in the care of your patient. If you have any questions, please feel free to contact me at [phone] or via email at [email or web address].\par \par Sincerely,\par \par Esther Edwards MD FAC

## 2020-01-07 ENCOUNTER — APPOINTMENT (OUTPATIENT)
Dept: CARDIOLOGY | Facility: CLINIC | Age: 75
End: 2020-01-07

## 2020-01-27 ENCOUNTER — APPOINTMENT (OUTPATIENT)
Dept: ORTHOPEDIC SURGERY | Facility: CLINIC | Age: 75
End: 2020-01-27
Payer: MEDICARE

## 2020-01-27 PROCEDURE — 20526 THER INJECTION CARP TUNNEL: CPT | Mod: RT

## 2020-01-27 PROCEDURE — 99214 OFFICE O/P EST MOD 30 MIN: CPT | Mod: 25

## 2020-01-30 ENCOUNTER — APPOINTMENT (OUTPATIENT)
Dept: RHEUMATOLOGY | Facility: CLINIC | Age: 75
End: 2020-01-30
Payer: MEDICARE

## 2020-01-30 ENCOUNTER — LABORATORY RESULT (OUTPATIENT)
Age: 75
End: 2020-01-30

## 2020-01-30 VITALS
OXYGEN SATURATION: 98 % | SYSTOLIC BLOOD PRESSURE: 133 MMHG | HEART RATE: 46 BPM | WEIGHT: 122 LBS | HEIGHT: 60 IN | BODY MASS INDEX: 23.95 KG/M2 | TEMPERATURE: 98.2 F | DIASTOLIC BLOOD PRESSURE: 71 MMHG

## 2020-01-30 DIAGNOSIS — R94.6 ABNORMAL RESULTS OF THYROID FUNCTION STUDIES: ICD-10-CM

## 2020-01-30 PROCEDURE — 99213 OFFICE O/P EST LOW 20 MIN: CPT

## 2020-01-31 PROBLEM — R94.6 ABNORMAL THYROID FUNCTION TEST: Status: ACTIVE | Noted: 2017-02-27

## 2020-01-31 LAB
25(OH)D3 SERPL-MCNC: 35.1 NG/ML
ALBUMIN SERPL ELPH-MCNC: 4.6 G/DL
ALP BLD-CCNC: 66 U/L
ALT SERPL-CCNC: 13 U/L
ANION GAP SERPL CALC-SCNC: 14 MMOL/L
AST SERPL-CCNC: 14 U/L
BASOPHILS # BLD AUTO: 0.02 K/UL
BASOPHILS NFR BLD AUTO: 0.2 %
BILIRUB SERPL-MCNC: 0.6 MG/DL
BUN SERPL-MCNC: 7 MG/DL
CALCIUM SERPL-MCNC: 9.7 MG/DL
CHLORIDE SERPL-SCNC: 99 MMOL/L
CO2 SERPL-SCNC: 24 MMOL/L
CREAT SERPL-MCNC: 0.67 MG/DL
CRP SERPL-MCNC: <0.1 MG/DL
EOSINOPHIL # BLD AUTO: 0.04 K/UL
EOSINOPHIL NFR BLD AUTO: 0.3 %
ERYTHROCYTE [SEDIMENTATION RATE] IN BLOOD BY WESTERGREN METHOD: 19 MM/HR
GLUCOSE SERPL-MCNC: 94 MG/DL
HCT VFR BLD CALC: 41.9 %
HGB BLD-MCNC: 12.7 G/DL
IMM GRANULOCYTES NFR BLD AUTO: 0.4 %
LYMPHOCYTES # BLD AUTO: 4.08 K/UL
LYMPHOCYTES NFR BLD AUTO: 33.1 %
MAN DIFF?: NORMAL
MCHC RBC-ENTMCNC: 25.8 PG
MCHC RBC-ENTMCNC: 30.3 GM/DL
MCV RBC AUTO: 85 FL
MONOCYTES # BLD AUTO: 0.95 K/UL
MONOCYTES NFR BLD AUTO: 7.7 %
NEUTROPHILS # BLD AUTO: 7.19 K/UL
NEUTROPHILS NFR BLD AUTO: 58.3 %
PLATELET # BLD AUTO: 333 K/UL
POTASSIUM SERPL-SCNC: 4.7 MMOL/L
PROT SERPL-MCNC: 7.1 G/DL
RBC # BLD: 4.93 M/UL
RBC # FLD: 15.4 %
SODIUM SERPL-SCNC: 137 MMOL/L
TSH SERPL-ACNC: 5.99 UIU/ML
WBC # FLD AUTO: 12.33 K/UL

## 2020-03-12 NOTE — ED PROVIDER NOTE - NEUROLOGICAL, MLM
Unable to fill per protocol.  Sending to provider.   Alert and oriented, no focal deficits, no motor or sensory deficits.

## 2020-04-07 NOTE — DISCHARGE NOTE ADULT - CAREGIVER ADDRESS
same as pt PRINCIPAL DISCHARGE DIAGNOSIS  Diagnosis: Infection due to [facility] coronavirus  Assessment and Plan of Treatment: You have tested POSITIVE for the novel coronavirus (COVID-19). Upon discharge, you must self-quarantine for 14 days, or until the Department of Health contacts you. Please wear a face mask if you are around other individuals. Try to avoid contact with house members, family, and friends for the duration of this quarantine. Please follow up with your primary care physician within 2-3 weeks of your discharge from [facility]. Please take all medications as prescribed. If you experience any worsening or recurrence of your symptoms, particularly worsening or high fever, shortness of breathe, extreme fatigue, or bloody cough please call 9-1-1 immediately or report to the nearest Emergency Department. If you have any questions or concerns, please do not hesitate to call the hospital at [phone].      SECONDARY DISCHARGE DIAGNOSES  Diagnosis: Hypoxia  Assessment and Plan of Treatment:     Diagnosis: SOB (shortness of breath)  Assessment and Plan of Treatment:     Diagnosis: Fever  Assessment and Plan of Treatment: PRINCIPAL DISCHARGE DIAGNOSIS  Diagnosis: Infection due to [facility] coronavirus  Assessment and Plan of Treatment: You have tested POSITIVE for the novel coronavirus (COVID-19). Upon discharge, you must self-quarantine for 14 days, or until the Department of Health contacts you. Please wear a face mask if you are around other individuals. Try to avoid contact with house members, family, and friends for the duration of this quarantine. Please follow up with your primary care physician within 2-3 weeks of your discharge from [facility]. Please take all medications as prescribed. If you experience any worsening or recurrence of your symptoms, particularly worsening or high fever, shortness of breathe, extreme fatigue, or bloody cough please call 9-1-1 immediately or report to the nearest Emergency Department. If you have any questions or concerns, please do not hesitate to call the hospital at [phone].      SECONDARY DISCHARGE DIAGNOSES  Diagnosis: Hypoxia  Assessment and Plan of Treatment: You will need to followup with your primary care provider in a few days, and he or she will be able to monitor your oxygen and lower the dose as needed, with a plan to wean you back to room air    Diagnosis: SOB (shortness of breath)  Assessment and Plan of Treatment:     Diagnosis: Fever  Assessment and Plan of Treatment:

## 2020-04-08 ENCOUNTER — APPOINTMENT (OUTPATIENT)
Dept: RHEUMATOLOGY | Facility: CLINIC | Age: 75
End: 2020-04-08

## 2020-06-23 RX ORDER — DENOSUMAB 60 MG/ML
60 INJECTION SUBCUTANEOUS
Qty: 1 | Refills: 0 | Status: ACTIVE | COMMUNITY
Start: 2019-08-19

## 2020-07-13 ENCOUNTER — APPOINTMENT (OUTPATIENT)
Dept: RHEUMATOLOGY | Facility: CLINIC | Age: 75
End: 2020-07-13
Payer: MEDICARE

## 2020-07-13 VITALS
DIASTOLIC BLOOD PRESSURE: 76 MMHG | HEART RATE: 60 BPM | SYSTOLIC BLOOD PRESSURE: 151 MMHG | WEIGHT: 122 LBS | TEMPERATURE: 97.6 F | HEIGHT: 60 IN | RESPIRATION RATE: 16 BRPM | BODY MASS INDEX: 23.95 KG/M2 | OXYGEN SATURATION: 98 %

## 2020-07-13 DIAGNOSIS — M81.0 AGE-RELATED OSTEOPOROSIS W/OUT CURRENT PATHOLOGICAL FRACTURE: ICD-10-CM

## 2020-07-13 PROCEDURE — 96372 THER/PROPH/DIAG INJ SC/IM: CPT

## 2020-07-13 PROCEDURE — 99213 OFFICE O/P EST LOW 20 MIN: CPT | Mod: 25

## 2020-07-14 ENCOUNTER — MED ADMIN CHARGE (OUTPATIENT)
Age: 75
End: 2020-07-14

## 2020-07-14 ENCOUNTER — APPOINTMENT (OUTPATIENT)
Dept: ORTHOPEDIC SURGERY | Facility: CLINIC | Age: 75
End: 2020-07-14
Payer: MEDICARE

## 2020-07-14 VITALS — TEMPERATURE: 98.2 F

## 2020-07-14 PROCEDURE — 99214 OFFICE O/P EST MOD 30 MIN: CPT

## 2020-07-14 RX ORDER — DENOSUMAB 60 MG/ML
60 INJECTION SUBCUTANEOUS
Qty: 1 | Refills: 0 | Status: COMPLETED | OUTPATIENT
Start: 2020-07-14

## 2020-07-14 RX ADMIN — DENOSUMAB 0 MG/ML: 60 INJECTION SUBCUTANEOUS at 00:00

## 2020-07-14 NOTE — PHYSICAL EXAM
[General Appearance - Alert] : alert [General Appearance - In No Acute Distress] : in no acute distress [FreeTextEntry1] : bilat knee crepitis

## 2020-07-14 NOTE — ASSESSMENT
[FreeTextEntry1] : pt doing well \par prolia given \par patient counseled on risks/benefits/potential SE of medication\par will order synvisc for repeat bilat knee injectoin\par \par patient is aware of my assessment and plans\par

## 2020-07-20 RX ORDER — HYLAN G-F 20 16MG/2ML
48 SYRINGE (ML) INTRAARTICULAR
Qty: 2 | Refills: 1 | Status: ACTIVE | COMMUNITY
Start: 2020-07-14

## 2020-07-20 RX ORDER — HYLAN G-F 20 16MG/2ML
48 SYRINGE (ML) INTRAARTICULAR
Qty: 2 | Refills: 1 | Status: ACTIVE | COMMUNITY
Start: 2019-07-12

## 2020-07-23 ENCOUNTER — APPOINTMENT (OUTPATIENT)
Dept: OPHTHALMOLOGY | Facility: CLINIC | Age: 75
End: 2020-07-23

## 2020-07-29 ENCOUNTER — APPOINTMENT (OUTPATIENT)
Dept: RHEUMATOLOGY | Facility: CLINIC | Age: 75
End: 2020-07-29
Payer: MEDICARE

## 2020-07-29 VITALS
BODY MASS INDEX: 23.95 KG/M2 | TEMPERATURE: 98 F | OXYGEN SATURATION: 98 % | SYSTOLIC BLOOD PRESSURE: 149 MMHG | HEIGHT: 60 IN | WEIGHT: 122 LBS | HEART RATE: 73 BPM | DIASTOLIC BLOOD PRESSURE: 78 MMHG

## 2020-07-29 DIAGNOSIS — M17.0 BILATERAL PRIMARY OSTEOARTHRITIS OF KNEE: ICD-10-CM

## 2020-07-29 DIAGNOSIS — R21 RASH AND OTHER NONSPECIFIC SKIN ERUPTION: ICD-10-CM

## 2020-07-29 PROCEDURE — 20610 DRAIN/INJ JOINT/BURSA W/O US: CPT | Mod: 50

## 2020-07-30 PROBLEM — M17.0 DEGENERATIVE ARTHRITIS OF KNEE, BILATERAL: Status: ACTIVE | Noted: 2019-08-19

## 2020-07-30 RX ORDER — HYLAN G-F 20 16MG/2ML
48 SYRINGE (ML) INTRAARTICULAR
Refills: 0 | Status: COMPLETED | OUTPATIENT
Start: 2020-07-30

## 2020-07-30 RX ADMIN — Medication 0 MG/6ML: at 00:00

## 2020-07-30 NOTE — PROVIDER CONTACT NOTE (OTHER) - RECOMMENDATIONS
Requested Prescriptions   Pending Prescriptions Disp Refills     HYDROcodone-acetaminophen (NORCO) 5-325 MG tablet 120 tablet 0     Sig: Take 1 tablet by mouth every 6 hours as needed for pain Max 4 tablets/day.       There is no refill protocol information for this order        Routing refill request to provider for review/approval because:  Drug not on the [facility] refill protocol   Deandra Neal RN,BSN  [facility]           Provider to bedside to assess urine. Provider to bedside to assess blood and patient.

## 2020-07-31 NOTE — PROCEDURE
[Arthrocentesis] : arthrocentesis was performed [Patient] : the patient [Consent Obtained] : written consent was obtained prior to the procedure and is detailed in the patient's record [Therapeutic] : therapeutic [#1 Site: ______] : #1 site identified in the [unfilled] [Ethyl Chloride] : ethyl chloride [21 gauge 1.5 inch] : A 21 gauge 1.5 inch needle was used [___ml Synvisc 1] : [unfilled] ml of synvisc 1 [No Complications] : there were no complications [#2 Site: ___] : # 2 site identified in the [unfilled] [Instructions Given] : handouts/patient instructions were given to patient [Patient Instructed to Call] : patient was instructed to call if redness at site, a decrease in range of motion or an increase in pain is noted after procedure. [de-identified] : synvisc one x 2 ,  buy and bill

## 2020-07-31 NOTE — ASSESSMENT
[FreeTextEntry1] : injections well tolerated\par patient counseled on risks/benefits/potential SE of medication\par \par requested name of DERM MD so she can be seen for mild rash

## 2020-08-14 ENCOUNTER — APPOINTMENT (OUTPATIENT)
Dept: DERMATOLOGY | Facility: CLINIC | Age: 75
End: 2020-08-14

## 2020-09-30 ENCOUNTER — APPOINTMENT (OUTPATIENT)
Dept: DERMATOLOGY | Facility: CLINIC | Age: 75
End: 2020-09-30

## 2021-02-03 ENCOUNTER — EMERGENCY (EMERGENCY)
Facility: HOSPITAL | Age: 76
LOS: 1 days | Discharge: HOME | End: 2021-02-03
Attending: STUDENT IN AN ORGANIZED HEALTH CARE EDUCATION/TRAINING PROGRAM
Payer: COMMERCIAL

## 2021-02-03 VITALS
HEART RATE: 66 BPM | OXYGEN SATURATION: 100 % | DIASTOLIC BLOOD PRESSURE: 76 MMHG | SYSTOLIC BLOOD PRESSURE: 170 MMHG | RESPIRATION RATE: 18 BRPM

## 2021-02-03 VITALS
OXYGEN SATURATION: 99 % | DIASTOLIC BLOOD PRESSURE: 80 MMHG | WEIGHT: 119.93 LBS | TEMPERATURE: 98 F | HEIGHT: 60 IN | RESPIRATION RATE: 20 BRPM | HEART RATE: 68 BPM | SYSTOLIC BLOOD PRESSURE: 179 MMHG

## 2021-02-03 DIAGNOSIS — Z90.3 ACQUIRED ABSENCE OF STOMACH [PART OF]: Chronic | ICD-10-CM

## 2021-02-03 DIAGNOSIS — Z98.89 OTHER SPECIFIED POSTPROCEDURAL STATES: Chronic | ICD-10-CM

## 2021-02-03 LAB
ALBUMIN SERPL ELPH-MCNC: 4.5 G/DL — SIGNIFICANT CHANGE UP (ref 3.3–5)
ALP SERPL-CCNC: 97 U/L — SIGNIFICANT CHANGE UP (ref 40–120)
ALT FLD-CCNC: 16 U/L — SIGNIFICANT CHANGE UP (ref 10–45)
ANION GAP SERPL CALC-SCNC: 13 MMOL/L — SIGNIFICANT CHANGE UP (ref 5–17)
AST SERPL-CCNC: 19 U/L — SIGNIFICANT CHANGE UP (ref 10–40)
BASOPHILS # BLD AUTO: 0.04 K/UL — SIGNIFICANT CHANGE UP (ref 0–0.2)
BASOPHILS NFR BLD AUTO: 0.4 % — SIGNIFICANT CHANGE UP (ref 0–2)
BILIRUB SERPL-MCNC: 0.5 MG/DL — SIGNIFICANT CHANGE UP (ref 0.2–1.2)
BUN SERPL-MCNC: 10 MG/DL — SIGNIFICANT CHANGE UP (ref 7–23)
CALCIUM SERPL-MCNC: 9.8 MG/DL — SIGNIFICANT CHANGE UP (ref 8.4–10.5)
CHLORIDE SERPL-SCNC: 104 MMOL/L — SIGNIFICANT CHANGE UP (ref 96–108)
CO2 SERPL-SCNC: 23 MMOL/L — SIGNIFICANT CHANGE UP (ref 22–31)
CREAT SERPL-MCNC: 0.73 MG/DL — SIGNIFICANT CHANGE UP (ref 0.5–1.3)
EOSINOPHIL # BLD AUTO: 0.39 K/UL — SIGNIFICANT CHANGE UP (ref 0–0.5)
EOSINOPHIL NFR BLD AUTO: 4.2 % — SIGNIFICANT CHANGE UP (ref 0–6)
GLUCOSE SERPL-MCNC: 90 MG/DL — SIGNIFICANT CHANGE UP (ref 70–99)
HCT VFR BLD CALC: 41.7 % — SIGNIFICANT CHANGE UP (ref 34.5–45)
HGB BLD-MCNC: 13.4 G/DL — SIGNIFICANT CHANGE UP (ref 11.5–15.5)
IMM GRANULOCYTES NFR BLD AUTO: 0.4 % — SIGNIFICANT CHANGE UP (ref 0–1.5)
LYMPHOCYTES # BLD AUTO: 3.74 K/UL — HIGH (ref 1–3.3)
LYMPHOCYTES # BLD AUTO: 40 % — SIGNIFICANT CHANGE UP (ref 13–44)
MCHC RBC-ENTMCNC: 26.8 PG — LOW (ref 27–34)
MCHC RBC-ENTMCNC: 32.1 GM/DL — SIGNIFICANT CHANGE UP (ref 32–36)
MCV RBC AUTO: 83.4 FL — SIGNIFICANT CHANGE UP (ref 80–100)
MONOCYTES # BLD AUTO: 0.56 K/UL — SIGNIFICANT CHANGE UP (ref 0–0.9)
MONOCYTES NFR BLD AUTO: 6 % — SIGNIFICANT CHANGE UP (ref 2–14)
NEUTROPHILS # BLD AUTO: 4.59 K/UL — SIGNIFICANT CHANGE UP (ref 1.8–7.4)
NEUTROPHILS NFR BLD AUTO: 49 % — SIGNIFICANT CHANGE UP (ref 43–77)
NRBC # BLD: 0 /100 WBCS — SIGNIFICANT CHANGE UP (ref 0–0)
PLATELET # BLD AUTO: 314 K/UL — SIGNIFICANT CHANGE UP (ref 150–400)
POTASSIUM SERPL-MCNC: 4.3 MMOL/L — SIGNIFICANT CHANGE UP (ref 3.5–5.3)
POTASSIUM SERPL-SCNC: 4.3 MMOL/L — SIGNIFICANT CHANGE UP (ref 3.5–5.3)
PROT SERPL-MCNC: 7.3 G/DL — SIGNIFICANT CHANGE UP (ref 6–8.3)
RBC # BLD: 5 M/UL — SIGNIFICANT CHANGE UP (ref 3.8–5.2)
RBC # FLD: 14.2 % — SIGNIFICANT CHANGE UP (ref 10.3–14.5)
SODIUM SERPL-SCNC: 140 MMOL/L — SIGNIFICANT CHANGE UP (ref 135–145)
WBC # BLD: 9.36 K/UL — SIGNIFICANT CHANGE UP (ref 3.8–10.5)
WBC # FLD AUTO: 9.36 K/UL — SIGNIFICANT CHANGE UP (ref 3.8–10.5)

## 2021-02-03 PROCEDURE — 85025 COMPLETE CBC W/AUTO DIFF WBC: CPT

## 2021-02-03 PROCEDURE — 70498 CT ANGIOGRAPHY NECK: CPT

## 2021-02-03 PROCEDURE — 70496 CT ANGIOGRAPHY HEAD: CPT

## 2021-02-03 PROCEDURE — 80053 COMPREHEN METABOLIC PANEL: CPT

## 2021-02-03 PROCEDURE — 99284 EMERGENCY DEPT VISIT MOD MDM: CPT | Mod: 25

## 2021-02-03 PROCEDURE — 70498 CT ANGIOGRAPHY NECK: CPT | Mod: 26,MA

## 2021-02-03 PROCEDURE — 70450 CT HEAD/BRAIN W/O DYE: CPT

## 2021-02-03 PROCEDURE — 93005 ELECTROCARDIOGRAM TRACING: CPT

## 2021-02-03 PROCEDURE — 99285 EMERGENCY DEPT VISIT HI MDM: CPT

## 2021-02-03 PROCEDURE — 70496 CT ANGIOGRAPHY HEAD: CPT | Mod: 26,MA

## 2021-02-03 RX ORDER — MECLIZINE HCL 12.5 MG
25 TABLET ORAL ONCE
Refills: 0 | Status: COMPLETED | OUTPATIENT
Start: 2021-02-03 | End: 2021-02-03

## 2021-02-03 RX ORDER — MECLIZINE HCL 12.5 MG
1 TABLET ORAL
Qty: 6 | Refills: 0
Start: 2021-02-03 | End: 2021-02-04

## 2021-02-03 RX ORDER — SODIUM CHLORIDE 9 MG/ML
500 INJECTION INTRAMUSCULAR; INTRAVENOUS; SUBCUTANEOUS ONCE
Refills: 0 | Status: COMPLETED | OUTPATIENT
Start: 2021-02-03 | End: 2021-02-03

## 2021-02-03 RX ADMIN — SODIUM CHLORIDE 500 MILLILITER(S): 9 INJECTION INTRAMUSCULAR; INTRAVENOUS; SUBCUTANEOUS at 15:06

## 2021-02-03 RX ADMIN — Medication 25 MILLIGRAM(S): at 15:06

## 2021-02-03 NOTE — ED PROVIDER NOTE - OBJECTIVE STATEMENT
76 y/o F pmhx htn and vertigo presenting with feeling of room spinning associated with nausea since Thursday. Patient states that she has been experiencing feeling of room spinning that is made significantly worse by lying flat or when getting up from laying down for almost one week. She states that she has been very uncomfortably when laying down at night secondary to her symptoms. She also reports that her dizziness worsens when she looks to the left. She states that she feels very nauseated with these symptoms, and had one episode of vomiting 3 days ago. She denies any difficulty ambulating but does state that she feels that she has to hold on because of the dizziness. Denies any headache, visual changes, chest pain or shortness of breath.

## 2021-02-03 NOTE — ED ADULT NURSE NOTE - NSIMPLEMENTINTERV_GEN_ALL_ED
Implemented All Fall Risk Interventions:  [place] to call system. Call bell, personal items and telephone within reach. Instruct patient to call for assistance. Room bathroom lighting operational. Non-slip footwear when patient is off stretcher. Physically safe environment: no spills, clutter or unnecessary equipment. Stretcher in lowest position, wheels locked, appropriate side rails in place. Provide visual cue, wrist band, yellow gown, etc. Monitor gait and stability. Monitor for mental status changes and reorient to person, place, and time. Review medications for side effects contributing to fall risk. Reinforce activity limits and safety measures with patient and family.

## 2021-02-03 NOTE — ED PROVIDER NOTE - NSFOLLOWUPINSTRUCTIONS_ED_ALL_ED_FT
1. Continue to hydrate yourself well throughout the day   2. Rest   3. Take meclizine 1 tablet every 8 hours as needed for dizziness   4. Return to the ER for any new or worsening symptoms

## 2021-02-03 NOTE — ED PROVIDER NOTE - NS ED ROS FT
Constitutional: No fever or chills  Eyes: No visual changes, eye pain or redness  HEENT: No throat pain, ear pain, nasal pain. No nose bleeding.  CV: No chest pain or lower extremity edema  Resp: No SOB no cough  GI: No abd pain. No nausea or vomiting. No diarrhea. No constipation.   : No dysuria, hematuria.   MSK: No musculoskeletal pain  Skin: No rash  Neuro: +dizziness. No headache. No numbness or tingling. No weakness.

## 2021-02-03 NOTE — ED PROVIDER NOTE - PATIENT PORTAL LINK FT
You can access the FollowMyHealth Patient Portal offered by [facility] by registering at the following website: [email or web address]. By joining [facility]’s FollowMyHealth portal, you will also be able to view your health information using other applications (apps) compatible with our system.

## 2021-02-03 NOTE — ED PROVIDER NOTE - PROGRESS NOTE DETAILS
patient's symptoms markedly improved after meclizine. CTs negative for any acute findings and patient seen ambulating steadily without any return of symptoms. will d/c with meclizine PRN and instructions of strict return precautions. patient verbalized understanding and agreement with plan, stable for d/c at this time. -RUTHIE MercadoC

## 2021-02-03 NOTE — ED PROVIDER NOTE - CLINICAL SUMMARY MEDICAL DECISION MAKING FREE TEXT BOX
75 year old female with history of vertigo presented to ED with dizziness for the past 5 days. Symptoms are similar to her previous episodes of vertigo. No visual disturbance, focal weakness/numbness, paresthesia. On exam: CN 2-12 grossly intact, no nystagmus, EOMI intact, no facial droops, normal rapid alternating hand motion normal finger to nose. 5/5 strength in all 4 extremities. Impression: Likely BPPV vs Meniere's disease. Low suspicion for posterior CVA. Plan: CT head, meclizine. Reassess

## 2021-02-03 NOTE — ED ADULT NURSE NOTE - OBJECTIVE STATEMENT
75 yr old female to ED from home C/o dizziness since Thursday x 5 days Pt awake alert and orientedx4 HX htn 75 yr old female to ED from home C/o dizziness since Thursday x 5 days Pt awake alert and orientedx4 HX htn. Pt c/o "room spinning with vomit x1 3 days ago." Denies nausea at this time. Moving all four extremities Denies numbness or weakness Denies decreased sensation Denies blurry vision feels worsening room spinning when turning to the Left. Denies chills fever or cough. Denies chest pain or SOB

## 2021-02-17 ENCOUNTER — APPOINTMENT (OUTPATIENT)
Dept: ORTHOPEDIC SURGERY | Facility: CLINIC | Age: 76
End: 2021-02-17
Payer: MEDICARE

## 2021-02-17 DIAGNOSIS — M17.11 UNILATERAL PRIMARY OSTEOARTHRITIS, RIGHT KNEE: ICD-10-CM

## 2021-02-17 PROCEDURE — 99072 ADDL SUPL MATRL&STAF TM PHE: CPT

## 2021-02-17 PROCEDURE — 73562 X-RAY EXAM OF KNEE 3: CPT | Mod: RT

## 2021-02-17 PROCEDURE — 99215 OFFICE O/P EST HI 40 MIN: CPT

## 2021-02-17 RX ORDER — HYLAN G-F 20 16MG/2ML
48 SYRINGE (ML) INTRAARTICULAR
Qty: 1 | Refills: 0 | Status: ACTIVE | COMMUNITY
Start: 2021-02-17

## 2021-02-17 NOTE — DISCUSSION/SUMMARY
[de-identified] : Impression and recommendation; gave him favorable response to Synvisc in the past will repeat course of Synvisc notwithstanding radiographic findings of bone-on-bone contact lateral compartment. The patient and her  that ultimately she will be a candidate for total knee replacement

## 2021-02-17 NOTE — HISTORY OF PRESENT ILLNESS
[de-identified] : 75-year-old female 4 weeks ago began to experience pain anterolateral aspect right knee provoked by walking reports associated swelling denies locking. Has undergone Synvisc injections right knee in the past the last series one year ago with several months of symptom improvement denies locking or giving out.

## 2021-02-17 NOTE — PHYSICAL EXAM
[de-identified] : Constitutional:Well nourished , well developed and in no acute distress\par Psychiatric: Alert and oriented to time place and person.Appropriate affect\par Respiratory: Unlabored respirations,no audible wheezing\par Cardiovascular: no leg swelling  ankle edema\par Vascular: no calf or thigh tenderness, \par Peripheral pulses; intact\par Skin:Head, neck, arms and lower extremities:no lesions or discoloration\par Lymphatics:No groin adenopathy\par Neurological: intact light touch sensation and grossly intact coordination and motor power.\par Knee;Right: Effusion 1+ flexion 0/1:15 left 0/120 crepitus ligaments intact tender lateral joint line\par  [de-identified] : X-rays Right knee AP notch lateral sunrise reveal lateral compartment degenerative narrowing focal bone-on-bone contact

## 2021-03-11 DIAGNOSIS — Z01.818 ENCOUNTER FOR OTHER PREPROCEDURAL EXAMINATION: ICD-10-CM

## 2021-03-12 ENCOUNTER — APPOINTMENT (OUTPATIENT)
Dept: DISASTER EMERGENCY | Facility: CLINIC | Age: 76
End: 2021-03-12

## 2021-03-13 LAB — SARS-COV-2 N GENE NPH QL NAA+PROBE: NOT DETECTED

## 2021-03-15 ENCOUNTER — RESULT REVIEW (OUTPATIENT)
Age: 76
End: 2021-03-15

## 2021-03-15 ENCOUNTER — APPOINTMENT (OUTPATIENT)
Dept: OTOLARYNGOLOGY | Facility: CLINIC | Age: 76
End: 2021-03-15

## 2021-03-15 ENCOUNTER — OUTPATIENT (OUTPATIENT)
Dept: OUTPATIENT SERVICES | Facility: HOSPITAL | Age: 76
LOS: 1 days | End: 2021-03-15
Payer: COMMERCIAL

## 2021-03-15 DIAGNOSIS — Z90.3 ACQUIRED ABSENCE OF STOMACH [PART OF]: Chronic | ICD-10-CM

## 2021-03-15 DIAGNOSIS — K92.1 MELENA: ICD-10-CM

## 2021-03-15 DIAGNOSIS — Z98.89 OTHER SPECIFIED POSTPROCEDURAL STATES: Chronic | ICD-10-CM

## 2021-03-15 PROCEDURE — 88312 SPECIAL STAINS GROUP 1: CPT

## 2021-03-15 PROCEDURE — 88312 SPECIAL STAINS GROUP 1: CPT | Mod: 26

## 2021-03-15 PROCEDURE — 43239 EGD BIOPSY SINGLE/MULTIPLE: CPT

## 2021-03-15 PROCEDURE — 88305 TISSUE EXAM BY PATHOLOGIST: CPT | Mod: 26

## 2021-03-15 PROCEDURE — 88305 TISSUE EXAM BY PATHOLOGIST: CPT

## 2021-03-15 NOTE — PROGRESS NOTE ADULT - SUBJECTIVE AND OBJECTIVE BOX
Esophagogastroduodenoscopy Report  Indication: Abdominal pain, dyspepsia and early satiety  Referring MD:   Russ Esquivel MD  Instrument:  # [id number]  Anesthesia: MAC  Consent:  Informed consent was obtained from the patient after providing any opportunity for questions  Procedure: The gastroscope was gently passed through the incisoral orifice into the oral cavity and under direct visualization the esophagus was intubated. The endoscope was passed down the esophagus, through the stomach and into proximal jejunum. Color, texture, mucosa and anatomy of the esophagus, stomach, and duodenum were carefully examined with the scope. The patient tolerated the procedure well. After completion of the examination, the patient was transferred to the recovery room.   Preparation: NPO   Findings:   Oropharynx	Normal  Esophagus	Mild distal mucosal erythema (Bxed)  EG-junction	Hiatal hernia  Cardia	Normal.  Body	Normal   Antrum	Patchy mucosal erythema (Bxed)  Pylorus	Normal.  Duodenal Bulb	Normal   2nd portion	Normal  3rd portion	Normal.     EBL:0    Impression:   1. Gastritis  2. Esophagitis  3. Hiatal hernia     Plan:  1. Follow up path  2. Protonix 40mg daily  3. Avoid NSAID  4. Anti reflux measure  5. Treat for H. Pylori if positive        Procedure Start Time: 11:05  Procedure End Time:   11:09        Attending:     Russ Esquivel M.D.

## 2021-03-18 LAB — SURGICAL PATHOLOGY STUDY: SIGNIFICANT CHANGE UP

## 2021-04-02 ENCOUNTER — APPOINTMENT (OUTPATIENT)
Dept: OTOLARYNGOLOGY | Facility: CLINIC | Age: 76
End: 2021-04-02

## 2021-12-09 ENCOUNTER — APPOINTMENT (OUTPATIENT)
Dept: ORTHOPEDIC SURGERY | Facility: CLINIC | Age: 76
End: 2021-12-09

## 2022-01-02 ENCOUNTER — LABORATORY RESULT (OUTPATIENT)
Age: 77
End: 2022-01-02

## 2022-01-03 ENCOUNTER — APPOINTMENT (OUTPATIENT)
Dept: GASTROENTEROLOGY | Facility: CLINIC | Age: 77
End: 2022-01-03
Payer: MEDICARE

## 2022-01-03 VITALS
TEMPERATURE: 97.4 F | DIASTOLIC BLOOD PRESSURE: 71 MMHG | OXYGEN SATURATION: 99 % | SYSTOLIC BLOOD PRESSURE: 148 MMHG | WEIGHT: 120 LBS | BODY MASS INDEX: 23.44 KG/M2 | HEART RATE: 61 BPM

## 2022-01-03 DIAGNOSIS — C49.A0 GASTROINTESTINAL STOMACL TUMOR,UNSPECIFIED SITE: ICD-10-CM

## 2022-01-03 DIAGNOSIS — R11.2 NAUSEA WITH VOMITING, UNSPECIFIED: ICD-10-CM

## 2022-01-03 DIAGNOSIS — K82.4 CHOLESTEROLOSIS OF GALLBLADDER: ICD-10-CM

## 2022-01-03 DIAGNOSIS — R10.32 LEFT LOWER QUADRANT PAIN: ICD-10-CM

## 2022-01-03 DIAGNOSIS — R10.13 EPIGASTRIC PAIN: ICD-10-CM

## 2022-01-03 DIAGNOSIS — R19.8 OTHER SPECIFIED SYMPTOMS AND SIGNS INVOLVING THE DIGESTIVE SYSTEM AND ABDOMEN: ICD-10-CM

## 2022-01-03 PROCEDURE — 99204 OFFICE O/P NEW MOD 45 MIN: CPT

## 2022-01-03 RX ORDER — OMEPRAZOLE 40 MG/1
40 CAPSULE, DELAYED RELEASE ORAL
Qty: 30 | Refills: 3 | Status: ACTIVE | COMMUNITY
Start: 2022-01-03 | End: 1900-01-01

## 2022-01-03 RX ORDER — HYOSCYAMINE SULFATE 0.12 MG/1
0.12 TABLET, ORALLY DISINTEGRATING ORAL
Qty: 120 | Refills: 3 | Status: ACTIVE | COMMUNITY
Start: 2022-01-03 | End: 1900-01-01

## 2022-01-03 RX ORDER — SIMETHICONE 180 MG
180 CAPSULE ORAL 4 TIMES DAILY
Qty: 120 | Refills: 3 | Status: ACTIVE | COMMUNITY
Start: 2022-01-03 | End: 1900-01-01

## 2022-01-03 RX ORDER — TIMOLOL MALEATE 5 MG/ML
0.5 SOLUTION OPHTHALMIC
Qty: 5 | Refills: 0 | Status: ACTIVE | COMMUNITY
Start: 2021-12-23

## 2022-01-03 NOTE — REVIEW OF SYSTEMS
[Eyesight Problems] : eyesight problems [Abdominal Pain] : abdominal pain [Vomiting] : vomiting [Constipation] : constipation [Diarrhea] : diarrhea [Heartburn] : heartburn [Arthralgias] : arthralgias [Itching] : itching [Dizziness] : dizziness [Depression] : depression [Easy Bruising] : a tendency for easy bruising [Negative] : Heme/Lymph [FreeTextEntry4] : tinnitus [FreeTextEntry8] : nocturia, polyuria [de-identified] : rakesh

## 2022-01-03 NOTE — ASSESSMENT
[FreeTextEntry1] : Abdominal Pain: The patient complains of abdominal pain. The patient is to avoid nonsteroidal anti-inflammatory drugs and aspirin.  I recommend a low FOD-MAP diet.  I recommend a trial of Hyoscyamine 0.125 mg tablet PO 3 times a day PRN for the abdominal pain.\par Dyspepsia: The patient complains of dyspeptic symptoms.  The patient was advised to abide by an anti-gas (low FOD-MAP) diet.  The patient was given a pamphlet for anti-gas (low FOD-MAP).  The patient and I reviewed the anti-gas (low FOD-MAP) diet at length. The patient is to start on a trial of Simethicone one tablet 4 times a day p.r.n. abdominal pain and gas.\par GERD: The patient was advised to avoid late-night meals and dietary indiscretions.  The patient was advised to avoid fried and fatty foods.  The patient was advised to abide by an anti-GERD diet. The patient was given a pamphlet for anti-GERD.  The patient and I reviewed the anti-GERD diet at length. I recommend a trial of Pantoprazole 40 mg once a day x 3 months for the symptoms.\par Nausea/Vomiting: The patient complains of nausea/vomiting. If the symptoms of nausea/vomiting persists, the patient may require a trial of Zofran 4 mg twice a day.\par Alternating Diarrhea/Constipation: The patient complains of alternating diarrhea/constipation.  I recommend a low residue diet. The patient is to avoid fiber supplementation. The patient is to consider starting a trial of a probiotic such as Align once a day.    The patient agreed and will follow-up to reassess the symptoms.\par Gallbladder Polyps: The patient has gallbladder polyps on recent abdominal ultrasound.  The patient and I had a long discussion regarding the risks of gallbladder polyps progressing to gallbladder cancer if the size is greater than 1cm.  The patient was told of the importance for follow-up for serial abdominal ultrasounds.  The patient was told of the possible need for a cholecystectomy if the gallbladder polyps are greater than 1cm.  The patient agreed and will follow-up.   \par Cholelithiasis: The recent abdominal ultrasound performed revealed evidence of cholelithiasis and possible gallbladder polyps.  The patient complains of mild abdominal pain in the right upper quadrant.  The patient has no evidence suggestive of biliary colic.  The findings may be suggestive of asymptomatic gallstones. The patient was seen by Dr. Ralph Faustin who recommends a HIDA scan because of findings noted on recent endoscopic ultrasound.  I recommend proceeding with the HIDA scan to assess for cholecystitis The patient and I had a long discussion regarding the findings of the abdominal ultrasound.  There is no need for a surgical evaluation at this time unless the HIDA scan is positive.   The patient and I also discussed the possible complications of gallstone disease that include biliary colic, gallbladder perforation, choledocholithiasis, cholangitis, gallstone ileus, gallstone pancreatitis, et cetera. The patient is aware and agrees to contact the office if symptoms develop.  The patient was advised to go to the emergency room if fever, chills and worsening abdominal pain develops.  If symptoms develop, a surgical evaluation and possible surgery may be required.  \par History of GIST: A prior upper endoscopy performed by Dr. Kami Ac revealed a gastric lesion in the fundus of the stomach (gastric polyp versus subepithelial lesion).  The pathology performed on May 18, 2016 revealed a few detached and/or crush cells with immunoreactivity for , CD34 and DOG1 likely a gastrointestinal stromal tumor.  Also noted was chronic mild active gastritis that was negative for Helicobacter pylori.  The patient underwent a partial gastrectomy by Dr. Cristóbal Avila on June 7, 2016.  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  The resection margins were negative for tumor with a pathologic staging of T1NX.  The patient tolerated the surgery well.  Followup did not reveal recurrence of the GIST.\par Follow-up: The patient is to follow-up in the office in 4 weeks to reassess the symptoms. The patient was told to call the office if any further problems.

## 2022-01-03 NOTE — HISTORY OF PRESENT ILLNESS
[None] : had no significant interval events [Yellow Skin Or Eyes (Jaundice)] : denies jaundice [Rectal Pain] : denies rectal pain [Heartburn] : heartburn [Nausea] : nausea [Vomiting] : vomiting [Diarrhea] : diarrhea [Constipation] : constipation [Abdominal Pain] : abdominal pain [Abdominal Swelling] : abdominal swelling [GERD] : gastroesophageal reflux disease [Hiatus Hernia] : hiatus hernia [Cholelithiasis] : cholelithiasis [Wt Gain ___ Lbs] : no recent weight gain [Wt Loss ___ Lbs] : no recent weight loss [Peptic Ulcer Disease] : no peptic ulcer disease [Pancreatitis] : no pancreatitis [Kidney Stone] : no kidney stone [Inflammatory Bowel Disease] : no inflammatory bowel disease [Irritable Bowel Syndrome] : no irritable bowel syndrome [Diverticulitis] : no diverticulitis [Alcohol Abuse] : no alcohol abuse [Malignancy] : no malignancy [Abdominal Surgery] : no abdominal surgery [Appendectomy] : no appendectomy [Cholecystectomy] : no cholecystectomy [de-identified] : The abdominal ultrasound performed on December 1, 2021 revealed cholelithiasis with subcentimeter gallbladder polyps with no findings of acute cholecystitis.  The CT colonography performed on March 18, 2021 revealed a moderately tortuous colon with mild scattered diverticulosis with no persistent wall thickening, mass or discrete mucosal lesion identified.  Also noted were cholelithiasis.  The endoscopic ultrasound performed by Dr. Ralph Faustin revealed a small sliding hiatal hernia, a normal esophagus, moderate inflammation found in the gastric antrum.  The endoscopic ultrasound performed by Dr. Ralph Faustin on December 13, 2021 revealed pancreatic parenchymal abnormalities consisting of diffuse echogenicity and lobularity were noted in the entire pancreas.  These are nonspecific findings but could represent a resolving pancreatitis, fatty infiltration or chronic pancreatitis.  There is no sign of significant pathology in the common bile duct, common hepatic duct and intrahepatic bile ducts.  Multiple stones and polyps were visualized endosonographically in the gallbladder. [de-identified] : The patient is a 75-year-old  female with past medical history significant for hypertension who was referred to my office by Dr. Nile Amaro for abdominal pain, dyspepsia, gastroesophageal reflux disease and nausea/vomiting. The patient also admits to alternating diarrhea/constipation, change in bowel habits and change in caliber of stool. I was asked to render an opinion for consultation for the above complaints.   The patient states that she is feeling uncomfortable x 1 year.  The abdominal ultrasound performed on 2021 revealed cholelithiasis with subcentimeter gallbladder polyps with no findings of acute cholecystitis.  The CT colonography performed on 2021 revealed a moderately tortuous colon with mild scattered diverticulosis with no persistent wall thickening, mass or discrete mucosal lesion identified.  Also noted were cholelithiasis.  The endoscopic ultrasound performed by Dr. Ralph Faustin revealed a small sliding hiatal hernia, a normal esophagus, moderate inflammation found in the gastric antrum.  The endoscopic ultrasound performed by Dr. Ralph Faustin on 2021 revealed pancreatic parenchymal abnormalities consisting of diffuse echogenicity and lobularity were noted in the entire pancreas.  These are nonspecific findings but could represent a resolving pancreatitis, fatty infiltration or chronic pancreatitis.  There is no sign of significant pathology in the common bile duct, common hepatic duct and intrahepatic bile ducts.  Multiple stones and polyps were visualized endosonographically in the gallbladder.The patient was previously evaluated at [facility] in  for history of colitis.  A prior upper endoscopy performed by Dr. Kami Ac revealed a gastric lesion in the fundus of the stomach (gastric polyp versus subepithelial lesion).  The pathology performed on May 18, 2016 revealed a few detached and/or crush cells with immunoreactivity for , CD34 and DOG1 likely a gastrointestinal stromal tumor.  Also noted was chronic mild active gastritis that was negative for Helicobacter pylori.  The patient underwent a partial gastrectomy by Dr. Cristóbal Avila on 2016.  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  The resection margins were negative for tumor with a pathologic staging of T1NX.  The patient tolerated the surgery well.  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  The patient had an upper endoscopy performed on March 15, 2021 by Dr. Russ Esquivel.  The upper endoscopy performed on March 15, 2021 revealed mild distal mucosal erythema in the distal esophagus, a hiatal hernia and patchy mucosal erythema in the antrum of the stomach.  The pathology revealed fragments of unremarkable squamous esophageal epithelium with a PASF stain that is negative for fungal microorganisms and chronic inactive gastritis that was negative for Helicobacter pylori.  The blood work performed on February 3, 2021 revealed no evidence of anemia with a hemoglobin/hematocrit level of 13.4/41.7, respectively, normal liver enzymes with a total bilirubin of 0.5 mg/dL, a normal alkaline phosphatase/AST/ALT of 97/19/16 U/L, respectively, the blood work performed on 2020 revealed normal liver enzymes with a total bilirubin of 0.6 mg/dL, a normal alkaline phosphatase/AST/ALT of 66/14/13 U/L, respectively, no evidence of anemia with a hemoglobin/hematocrit level of 12.7/41.9, respectively, and elevated WBC count of 12.33 K/ul, and elevated TSH of 5.99 uIU/ml, a normal vitamin D level of 35.1 ng/mL, a normal C-reactive protein of < 10 mg/dl, and a normal ESR of 19 mm/h.  The prior CAT scan of the abdomen pelvis with IV contrast performed on 2018 revealed no evidence of disease recurrence.  The patient complains of abdominal pain.  The patient describes the abdominal pain as a crampy, intermittent left lower quadrant discomfort that is nonradiating in nature.  The abdominal pain is worse with meals and improves with passing gas or having a bowel movement.  The abdominal pain is described as moderate in nature.  The abdominal pain occurs at night and in the morning.  The abdominal pain can occur at any time.   The abdominal pain never has awakened the patient from sleep.  The abdominal pain is slightly relieved with certain medication such as advil.  The abdominal pain is associated with abdominal gas and bloating.  The patient complains of nausea and vomiting.  The patient complains of gastroesophageal reflux disease but denies any dysphagia. The gastroesophageal reflux disease is worse after meals and late at night and in the early morning. The gastroesophageal reflux disease is improved with proton pump inhibitors, H2 blockers and antacids.  The patient denies any atypical chest pain, shortness of breath or palpitations.  The patient denies any diaphoresis. The patient complains of alternating diarrhea/constipation.  The patient has 1 to 3 bowel movement every other day.  The patient complains of a change in bowel habits.  The patient complains of a change in caliber of stool.   The diarrhea is described as soft to watery in nature.  The patient denies having mucus discharge with the bowel movements.  The patient denies any bright red blood per rectum, melena or hematemesis.  The patient previously complains of rectal discomfort but denies any rectal pruritus. The patient denies any weight loss or anorexia.  She denies any fevers or chills.  The patient denies any jaundice or pruritus.  The patient complains of occasional lower back pain.  The patient admits to having a prior upper endoscopy and colonoscopy performed by another gastroenterologist.  The upper endoscopic findings revealed gastritis.  The colonoscopic findings revealed a long and tortuous colon and internal hemorrhoids.   The patient's last menstrual period was age 53. The patient is a .  The patient's first menstrual period was at age 15.  The patient admits to a family history of GI problems.  The patient’s sister had a history of gallbladder disease.\par  [de-identified] : (-) smoking, (-) ETOH, (-) IVDA\par

## 2022-03-07 ENCOUNTER — APPOINTMENT (OUTPATIENT)
Dept: GASTROENTEROLOGY | Facility: CLINIC | Age: 77
End: 2022-03-07

## 2022-03-24 ENCOUNTER — APPOINTMENT (OUTPATIENT)
Dept: SURGERY | Facility: CLINIC | Age: 77
End: 2022-03-24
Payer: MEDICARE

## 2022-03-24 VITALS
HEIGHT: 60 IN | OXYGEN SATURATION: 99 % | SYSTOLIC BLOOD PRESSURE: 152 MMHG | DIASTOLIC BLOOD PRESSURE: 83 MMHG | BODY MASS INDEX: 23.36 KG/M2 | WEIGHT: 119 LBS | HEART RATE: 64 BPM

## 2022-03-24 VITALS — TEMPERATURE: 97.1 F

## 2022-03-24 DIAGNOSIS — K80.20 CALCULUS OF GALLBLADDER W/OUT CHOLECYSTITIS W/OUT OBSTRUCTION: ICD-10-CM

## 2022-03-24 PROCEDURE — 99204 OFFICE O/P NEW MOD 45 MIN: CPT

## 2022-03-24 NOTE — PLAN
[FreeTextEntry1] : Ms. LEOS  was told significance of findings, options, risks and benefits were explained.  Informed consent for laparoscopic/possible open  cholecystectomy ( high chance of open)  and potential risks, benefits and alternatives (surgical options were discussed including non-surgical options or the option of no surgery) to the planned surgery were discussed in depth.  All surgical options were discussed including non-surgical treatments.  She wishes to proceed with surgery.  We will plan for surgery on at the next available date, pending any required insurance pre-certification or pre-approval. She agrees to obtain any necessary pre-operative evaluations and testing prior to surgery. Patient instructed to maintain a fat-free diet, and to seek immediate medical attention with any acute change or worsening of symptoms, including but not limited to abdominal pain, fever, chills, nausea, vomiting, or yellowing of the skin. \par Patient advised to seek immediate medical attention with any acute change in symptoms or with the development of any new or worsening symptoms.  Patient's questions and concerns addressed to patient's satisfaction, and patient verbalized an understanding of the information discussed.

## 2022-03-24 NOTE — CONSULT LETTER
[Dear  ___] : Dear  [unfilled], [Consult Letter:] : I had the pleasure of evaluating your patient, [unfilled]. [Please see my note below.] : Please see my note below. [Consult Closing:] : Thank you very much for allowing me to participate in the care of this patient.  If you have any questions, please do not hesitate to contact me. [Sincerely,] : Sincerely, [FreeTextEntry3] : Jian Luna MD, FACS

## 2022-03-24 NOTE — PHYSICAL EXAM
[Abdominal Masses] : No abdominal masses [Abdomen Tenderness] : ~T ~M No abdominal tenderness [Alert] : alert [Oriented to Person] : oriented to person [Oriented to Place] : oriented to place [Oriented to Time] : oriented to time [Calm] : calm [de-identified] : She  is alert, well-groomed, and in NAD\par   [de-identified] : anicteric.  Nasal mucosa pink, septum midline. Oral mucosa pink.  Tongue midline, Pharynx without exudates.\par   [de-identified] : Neck supple. Trachea midline. Thyroid isthmus barely palpable, lobes not felt.\par   [de-identified] : midline scar, well  healed

## 2022-03-24 NOTE — HISTORY OF PRESENT ILLNESS
[de-identified] : Ms. ALISON LEOS is a 76 year  old patient who was referred by Dr. Russ Esquivel with the chief complaint of having right upper quadrant and epigastric pain for 3 years. Pain is radiating to the the back. She reports no nausea or vomiting and no history of jaundice, acholia or choluria.   Appetite is good and weight is stable.   She   has  family history of biliary tract disease in her sister.  She had an abdominal sonogram on  12/01/2021 which revealed Gb stones with subcentimeter polyps. CBD is normal.  Ms. LEOS  is s/p HIDA scan on 01/07/2022 that showed 85% EF. Ms. LEOS  is s/p partial gastrectomy in 2016.

## 2022-03-28 ENCOUNTER — TRANSCRIPTION ENCOUNTER (OUTPATIENT)
Age: 77
End: 2022-03-28

## 2022-03-28 ENCOUNTER — INPATIENT (INPATIENT)
Facility: HOSPITAL | Age: 77
LOS: 1 days | Discharge: HOME | DRG: 419 | End: 2022-03-30
Attending: SPECIALIST | Admitting: SPECIALIST
Payer: COMMERCIAL

## 2022-03-28 VITALS
RESPIRATION RATE: 19 BRPM | OXYGEN SATURATION: 98 % | WEIGHT: 113.1 LBS | SYSTOLIC BLOOD PRESSURE: 184 MMHG | HEART RATE: 73 BPM | DIASTOLIC BLOOD PRESSURE: 80 MMHG | HEIGHT: 60 IN | TEMPERATURE: 98 F

## 2022-03-28 DIAGNOSIS — Z98.89 OTHER SPECIFIED POSTPROCEDURAL STATES: Chronic | ICD-10-CM

## 2022-03-28 DIAGNOSIS — Z90.3 ACQUIRED ABSENCE OF STOMACH [PART OF]: Chronic | ICD-10-CM

## 2022-03-28 DIAGNOSIS — K80.20 CALCULUS OF GALLBLADDER WITHOUT CHOLECYSTITIS WITHOUT OBSTRUCTION: ICD-10-CM

## 2022-03-28 LAB
ALBUMIN SERPL ELPH-MCNC: 3.9 G/DL — SIGNIFICANT CHANGE UP (ref 3.5–5)
ALP SERPL-CCNC: 99 U/L — SIGNIFICANT CHANGE UP (ref 40–120)
ALT FLD-CCNC: 22 U/L DA — SIGNIFICANT CHANGE UP (ref 10–60)
ANION GAP SERPL CALC-SCNC: 6 MMOL/L — SIGNIFICANT CHANGE UP (ref 5–17)
AST SERPL-CCNC: 16 U/L — SIGNIFICANT CHANGE UP (ref 10–40)
BASOPHILS # BLD AUTO: 0.04 K/UL — SIGNIFICANT CHANGE UP (ref 0–0.2)
BASOPHILS NFR BLD AUTO: 0.4 % — SIGNIFICANT CHANGE UP (ref 0–2)
BILIRUB SERPL-MCNC: 0.8 MG/DL — SIGNIFICANT CHANGE UP (ref 0.2–1.2)
BLD GP AB SCN SERPL QL: SIGNIFICANT CHANGE UP
BUN SERPL-MCNC: 8 MG/DL — SIGNIFICANT CHANGE UP (ref 7–18)
CALCIUM SERPL-MCNC: 9.1 MG/DL — SIGNIFICANT CHANGE UP (ref 8.4–10.5)
CHLORIDE SERPL-SCNC: 107 MMOL/L — SIGNIFICANT CHANGE UP (ref 96–108)
CO2 SERPL-SCNC: 27 MMOL/L — SIGNIFICANT CHANGE UP (ref 22–31)
CREAT SERPL-MCNC: 0.82 MG/DL — SIGNIFICANT CHANGE UP (ref 0.5–1.3)
EGFR: 74 ML/MIN/1.73M2 — SIGNIFICANT CHANGE UP
EOSINOPHIL # BLD AUTO: 0.19 K/UL — SIGNIFICANT CHANGE UP (ref 0–0.5)
EOSINOPHIL NFR BLD AUTO: 1.8 % — SIGNIFICANT CHANGE UP (ref 0–6)
GLUCOSE SERPL-MCNC: 100 MG/DL — HIGH (ref 70–99)
HCT VFR BLD CALC: 37.2 % — SIGNIFICANT CHANGE UP (ref 34.5–45)
HGB BLD-MCNC: 12.3 G/DL — SIGNIFICANT CHANGE UP (ref 11.5–15.5)
IMM GRANULOCYTES NFR BLD AUTO: 0.3 % — SIGNIFICANT CHANGE UP (ref 0–1.5)
INR BLD: 1.02 RATIO — SIGNIFICANT CHANGE UP (ref 0.88–1.16)
LIDOCAIN IGE QN: 168 U/L — SIGNIFICANT CHANGE UP (ref 73–393)
LYMPHOCYTES # BLD AUTO: 29.4 % — SIGNIFICANT CHANGE UP (ref 13–44)
LYMPHOCYTES # BLD AUTO: 3.08 K/UL — SIGNIFICANT CHANGE UP (ref 1–3.3)
MCHC RBC-ENTMCNC: 26.9 PG — LOW (ref 27–34)
MCHC RBC-ENTMCNC: 33.1 GM/DL — SIGNIFICANT CHANGE UP (ref 32–36)
MCV RBC AUTO: 81.2 FL — SIGNIFICANT CHANGE UP (ref 80–100)
MONOCYTES # BLD AUTO: 0.45 K/UL — SIGNIFICANT CHANGE UP (ref 0–0.9)
MONOCYTES NFR BLD AUTO: 4.3 % — SIGNIFICANT CHANGE UP (ref 2–14)
NEUTROPHILS # BLD AUTO: 6.7 K/UL — SIGNIFICANT CHANGE UP (ref 1.8–7.4)
NEUTROPHILS NFR BLD AUTO: 63.8 % — SIGNIFICANT CHANGE UP (ref 43–77)
NRBC # BLD: 0 /100 WBCS — SIGNIFICANT CHANGE UP (ref 0–0)
PLATELET # BLD AUTO: 307 K/UL — SIGNIFICANT CHANGE UP (ref 150–400)
POTASSIUM SERPL-MCNC: 3.9 MMOL/L — SIGNIFICANT CHANGE UP (ref 3.5–5.3)
POTASSIUM SERPL-SCNC: 3.9 MMOL/L — SIGNIFICANT CHANGE UP (ref 3.5–5.3)
PROT SERPL-MCNC: 7.2 G/DL — SIGNIFICANT CHANGE UP (ref 6–8.3)
PROTHROM AB SERPL-ACNC: 12.1 SEC — SIGNIFICANT CHANGE UP (ref 10.5–13.4)
RBC # BLD: 4.58 M/UL — SIGNIFICANT CHANGE UP (ref 3.8–5.2)
RBC # FLD: 13.8 % — SIGNIFICANT CHANGE UP (ref 10.3–14.5)
SARS-COV-2 RNA SPEC QL NAA+PROBE: SIGNIFICANT CHANGE UP
SODIUM SERPL-SCNC: 140 MMOL/L — SIGNIFICANT CHANGE UP (ref 135–145)
WBC # BLD: 10.49 K/UL — SIGNIFICANT CHANGE UP (ref 3.8–10.5)
WBC # FLD AUTO: 10.49 K/UL — SIGNIFICANT CHANGE UP (ref 3.8–10.5)

## 2022-03-28 PROCEDURE — 93010 ELECTROCARDIOGRAM REPORT: CPT

## 2022-03-28 PROCEDURE — 99284 EMERGENCY DEPT VISIT MOD MDM: CPT

## 2022-03-28 PROCEDURE — 71045 X-RAY EXAM CHEST 1 VIEW: CPT | Mod: 26

## 2022-03-28 PROCEDURE — 76705 ECHO EXAM OF ABDOMEN: CPT | Mod: 26

## 2022-03-28 RX ORDER — ONDANSETRON 8 MG/1
4 TABLET, FILM COATED ORAL EVERY 6 HOURS
Refills: 0 | Status: DISCONTINUED | OUTPATIENT
Start: 2022-03-28 | End: 2022-03-30

## 2022-03-28 RX ORDER — ACETAMINOPHEN 500 MG
650 TABLET ORAL ONCE
Refills: 0 | Status: COMPLETED | OUTPATIENT
Start: 2022-03-28 | End: 2022-03-28

## 2022-03-28 RX ORDER — HEPARIN SODIUM 5000 [USP'U]/ML
5000 INJECTION INTRAVENOUS; SUBCUTANEOUS EVERY 8 HOURS
Refills: 0 | Status: DISCONTINUED | OUTPATIENT
Start: 2022-03-28 | End: 2022-03-29

## 2022-03-28 RX ORDER — ONDANSETRON 8 MG/1
4 TABLET, FILM COATED ORAL ONCE
Refills: 0 | Status: COMPLETED | OUTPATIENT
Start: 2022-03-28 | End: 2022-03-28

## 2022-03-28 RX ORDER — HYDROMORPHONE HYDROCHLORIDE 2 MG/ML
0.5 INJECTION INTRAMUSCULAR; INTRAVENOUS; SUBCUTANEOUS EVERY 4 HOURS
Refills: 0 | Status: DISCONTINUED | OUTPATIENT
Start: 2022-03-28 | End: 2022-03-29

## 2022-03-28 RX ORDER — SODIUM CHLORIDE 9 MG/ML
1000 INJECTION, SOLUTION INTRAVENOUS
Refills: 0 | Status: DISCONTINUED | OUTPATIENT
Start: 2022-03-28 | End: 2022-03-30

## 2022-03-28 RX ORDER — MORPHINE SULFATE 50 MG/1
4 CAPSULE, EXTENDED RELEASE ORAL ONCE
Refills: 0 | Status: DISCONTINUED | OUTPATIENT
Start: 2022-03-28 | End: 2022-03-28

## 2022-03-28 RX ADMIN — Medication 650 MILLIGRAM(S): at 19:55

## 2022-03-28 RX ADMIN — HEPARIN SODIUM 5000 UNIT(S): 5000 INJECTION INTRAVENOUS; SUBCUTANEOUS at 21:45

## 2022-03-28 RX ADMIN — Medication 650 MILLIGRAM(S): at 19:25

## 2022-03-28 RX ADMIN — SODIUM CHLORIDE 90 MILLILITER(S): 9 INJECTION, SOLUTION INTRAVENOUS at 20:28

## 2022-03-28 NOTE — H&P ADULT - NSHPLABSRESULTS_GEN_ALL_CORE
12.3   10.49 )-----------( 307      ( 28 Mar 2022 18:13 )             37.2   03-28    140  |  107  |  8   ----------------------------<  100<H>  3.9   |  27  |  0.82    Ca    9.1      28 Mar 2022 18:13    TPro  7.2  /  Alb  3.9  /  TBili  0.8  /  DBili  x   /  AST  16  /  ALT  22  /  AlkPhos  99  03-28      < from: US Hepatic & Pancreatic (03.28.22 @ 19:08) >    FINDINGS:    Liver: Fatty infiltration of liver.  Bile ducts: Normal caliber. Common bile duct measures 3 mm.  Gallbladder: Cholelithiasis. No wall thickening, pericholecystic fluid,   or localized tenderness.  Pancreas: Poorly visualized.  Right kidney: 9.3 cm. No hydronephrosis.  Ascites: None.  IVC: Visualized portions are within normal limits.    IMPRESSION:    Cholelithiasis, without evidence of acute cholecystitis.  No biliary ductal dilatation.      < end of copied text >

## 2022-03-28 NOTE — CONSULT NOTE ADULT - SUBJECTIVE AND OBJECTIVE BOX
GENERAL SURGERY CONSULT NOTE    76 yr old female with hx of GIST s/o partial gastrectomy, HTN and cholelithiasis presents to ed c/o RUQ pain that radiates to back and shoulder. pt seen by dr lewis and sent in for admission. + nausea, no vomiting.    General surgery consulted on a 76 yr old female hx of HTN, GIST, s/p partial gastrectomy with known history of gallstones presenting to the ED with 10 days of RUQ pain. Pain is intermittent, severe and radiating to the back. Pt reports associated nausea without vomiting over the last 10 days. Pt has been seen and examined in the office by Dr. Luna. Imaging and labs pending at the time of exam. Pt offers no other acute complaints.     PAST MEDICAL & SURGICAL HISTORY:  HTN (hypertension)    Gastritis    Colitis  Diverticulitis    GIST, non-malignant    H/O varicose vein stripping  20 yrs ago    S/P partial gastrectomy        Review of Systems:    I have reviewed 9 systems with the patient and the only positive findings were above     MEDICATIONS  (STANDING):  morphine  - Injectable 4 milliGRAM(s) IV Push Once  ondansetron Injectable 4 milliGRAM(s) IV Push once    MEDICATIONS  (PRN):      Allergies    adhesives (Rash)  apple (Flushing; Rash)  Grapes (Flushing; Rash)  No Known Drug Allergies    Intolerances    FAMILY HISTORY:  Family history of diabetes mellitus (Sibling)    Vital Signs Last 24 Hrs  T(C): 36.7 (28 Mar 2022 16:54), Max: 36.7 (28 Mar 2022 16:54)  T(F): 98.1 (28 Mar 2022 16:54), Max: 98.1 (28 Mar 2022 16:54)  HR: 73 (28 Mar 2022 16:54) (73 - 73)  BP: 184/80 (28 Mar 2022 16:54) (184/80 - 184/80)  BP(mean): --  RR: 19 (28 Mar 2022 16:54) (19 - 19)  SpO2: 98% (28 Mar 2022 16:54) (98% - 98%)    Physical Exam:    General:  Appears stated age, well-groomed, no distress  Eyes : EOMI   HENT:  WNL, no JVD  Respirations: Unlabored   Abdomen: Soft, nondistended, tender RUQ/RLQ, no rebound, no guarding, no peritonitis, no rock sign   Extremities: No edema b/l   Skin:  Warm and dry   Musculoskeletal:  No calf tenderness b/l   Neuro: Alert, oriented to time, place and person   Psych:  Normal affect       LABS:                        12.3   10.49 )-----------( 307      ( 28 Mar 2022 18:13 )             37.2           PT/INR - ( 28 Mar 2022 18:13 )   PT: 12.1 sec;   INR: 1.02 ratio               RADIOLOGY & ADDITIONAL STUDIES:  PENDING

## 2022-03-28 NOTE — ED PROVIDER NOTE - OBJECTIVE STATEMENT
76 yr old female with hx of GIST s/o partial gastrectomy, HTN and cholelithiasis presents to ed c/o RUQ pain that radiates to back and shoulder. pt seen by dr lewis and sent in for admission. + nausea, no vomiting.

## 2022-03-28 NOTE — CONSULT NOTE ADULT - ASSESSMENT
75 y/o female with known history of gallstones, presenting with 10 days of RUQ pain and nausea     -Imaging pending, f/u US   -Signed out to night staff  -Plan to follow

## 2022-03-28 NOTE — H&P ADULT - HISTORY OF PRESENT ILLNESS
76 yr old female hx of HTN, GIST, s/p partial gastrectomy and cholelithiasis presenting to the ED with 10 days of RUQ pain. Pain is intermittent, severe and radiating to the back with associated nausea without vomiting over the last 10 days. Denies fever and chills . Pt was seen and examined in the office by Dr. Luna.  No other acute complaints at this time.

## 2022-03-28 NOTE — PATIENT PROFILE ADULT - MONEY FOR FOOD
[facility], [place]   Transplant Nephrology Progress Note  Date of Admission:  2019  Today's Date: 2019     Assessment & Plan    # DDKT: baseline Cr ~ TBD; DCD complicated by DGF              - Proteinuria: Not checked post transplant              - Date DSA Last Checked:Pending   -Patient is overloaded, discussed with surgery team on rounds we will proceed with dialysis today for volume management.     # Immunosuppression: usual standard induction    Tacrolimus immediate release (goal 8-10) and Mycophenolate mofetil (goal 1-3.5)              - Changes: No     # Prophylaxis:              - PJP: Dapsone              - CMV: Valcyte              - Thrush: None     # Hypertension: Controlled; Goal BP: < 140/90              - Volume status:  hypervolemic              - Changes: No     # Anemia in Chronic Renal Disease: Hgb: Stable      CHARLINE: No              - Iron studies: Not checked recently     # Mineral Bone Disorder:   - Secondary renal hyperparathyroidism; PTH level: Not checked recently  - Vitamin D; level: Not checked recently     # Electrolytes:   - Potassium; level: Hyperkalemia resolved   - Magnesium; level: Normal     # Transplant History:  Etiology of kidney failure: IgA nephropathy  Tx: DDKT  Transplant: 8/3/2019 (Kidney)  Donor Type:  - Cardiac Death        Donor Class: Standard Criteria Donor  Crossmatch at time of Tx: negative  Significant changes in immunosuppression: None  Significant transplant-related complications: None     Recommendations were communicated to the primary team via this note  Dean Wilson MD  Pager: [phone]    Interval History     Stable overnight, she had some worsening lower extremity edema which is getting in the way of her walking around.  Her legs feel tense with skin stretching.  She was slightly nauseated but her symptoms were controllable with anti-emetics.  She specifically denied any chest pains however had chest pressure  towards the end of the day which feels like her usual when she needs dialysis.  In regards to her bowel movements she had 2 but still feels slightly distended in the abdomen.    Review of Systems   4 point ROS was obtained and negative except as noted in the Interval History.    Physical Exam   Temp  Av.3  F (36.8  C)  Min: 97.6  F (36.4  C)  Max: 98.9  F (37.2  C)      Pulse  Av.1  Min: 67  Max: 129 Resp  Av.1  Min: 10  Max: 22  SpO2  Av.7 %  Min: 91 %  Max: 100 %    CVP (mmHg): 7 mmHgBP (!) 148/102   Pulse 90   Temp 98.5  F (36.9  C) (Oral)   Resp 20   Ht 1.524 m (5')   Wt 60.4 kg (133 lb 1.6 oz)   SpO2 97%   BMI 25.99 kg/m     Date 19 07 - 19 0659   Shift 1560-5984 8035-5143 1048-3536 24 Hour Total   INTAKE   I.V. 30   30   Shift Total(mL/kg) 30(0.56)   30(0.56)   OUTPUT   Shift Total(mL/kg)       Weight (kg) 54 54 54 54      Admit Weight: 54 kg (119 lb 0.8 oz)   GENERAL APPEARANCE: alert and no distress  HENT: mouth without ulcers or lesions  LYMPHATICS: no cervical or supraclavicular nodes  RESP: lungs clear to auscultation - no rales, rhonchi or wheezes  CV: regular rhythm, normal rate, no rub, no murmur  EDEMA: 2+ LE edema bilaterally  ABDOMEN: soft, nondistended, nontender, bowel sounds normal  MS: extremities normal - no gross deformities noted, no evidence of inflammation in joints, no muscle tenderness  SKIN: no rash    Data   All labs reviewed by me.  CMP  Recent Labs   Lab 19  0541 19  0545 19  0058 19  2047  19  0532  19  1900  19  0429  19  1938   * 133  --   --   --  132*  --  131*  --  132*   < > 134   POTASSIUM 4.3 4.2 4.3 4.3   < > 4.6   < > 4.5   < > 4.4   < > 3.6   CHLORIDE 98 99  --   --   --  100  --  99  --  99   < > 91*   CO2 20 23  --   --   --  27  --  24  --  24   < > 35*   ANIONGAP 14 11  --   --   --  6  --  8  --  9   < > 8   GLC 90 85  --   --   --  126*  --  120*  --  116*   < > 82   BUN  78* 45*  --   --   --  17  --  36*  --  24   < > 22   CR 9.60* 7.35*  --   --   --  4.24*  --  7.58*  --  5.90*   < > 6.11*   GFRESTIMATED 5* 7*  --   --   --  13*  --  7*  --  9*   < > 9*   GFRESTBLACK 6* 8*  --   --   --  16*  --  8*  --  10*   < > 10*   SHAMIR 8.8 9.2  --   --   --  9.2  --  9.0  --  9.1   < > 9.8   MAG 2.7* 2.1  --   --   --  1.7  --   --   --  1.6   < >  --    PHOS 6.4* 5.8*  --   --   --  4.2  --   --   --  6.4*   < >  --    PROTTOTAL  --   --   --   --   --   --   --   --   --   --   --  8.6   ALBUMIN  --   --   --   --   --   --   --   --   --   --   --  4.2   BILITOTAL  --   --   --   --   --   --   --   --   --   --   --  0.8   ALKPHOS  --   --   --   --   --   --   --   --   --   --   --  65   AST  --   --   --   --   --   --   --   --   --   --   --  4   ALT  --   --   --   --   --   --   --   --   --   --   --  13    < > = values in this interval not displayed.     CBC  Recent Labs   Lab 08/07/19  0541 08/06/19  2206 08/06/19  0545 08/05/19  0532  08/04/19  0429   HGB 7.9* 8.4* 6.9* 7.7*   < > 7.6*   WBC 5.5  --  6.3 5.1  --  10.9   RBC 2.50*  --  2.18* 2.45*  --  2.39*   HCT 24.3*  --  21.8* 25.0*  --  24.2*   MCV 97  --  100 102*  --  101*   MCH 31.6  --  31.7 31.4  --  31.8   MCHC 32.5  --  31.7 30.8*  --  31.4*   RDW 13.4  --  13.1 12.8  --  13.2   PLT 70*  --  79* 75*  --  118*    < > = values in this interval not displayed.     INR  Recent Labs   Lab 08/02/19  1938   INR 0.94   PTT 33     ABG  Recent Labs   Lab 08/03/19  1047   O2PER 40      Urine Studies  Recent Labs   Lab Test 12/10/13  2050 05/15/13  1951   COLOR Straw Dark Yellow   APPEARANCE Clear Slightly Cloudy   URINEGLC 70* Negative   URINEBILI Negative Negative   URINEKETONE Negative Negative   SG 1.008 1.011   UBLD Small* Moderate*   URINEPH 6.5 6.0   PROTEIN 100* 300*   NITRITE Negative Negative   LEUKEST Negative Large*   RBCU 2 19*   WBCU 9* 151*     Recent Labs   Lab Test 12/11/13  1915   UTPG 7.42*     PTH  Recent  Labs   Lab Test 12/11/13  0601   PTHI 1,131*     Iron Studies  Recent Labs   Lab Test 12/29/16  1338 12/11/13  0600   IRON 137 65   * 245   IRONSAT 60* 26   MONET 1,039*  --        IMAGING:  All imaging studies reviewed by me.     MEDICATIONS:  Current Facility-Administered Medications   Medication     acetaminophen (TYLENOL) tablet 975 mg     amLODIPine (NORVASC) tablet 10 mg     aspirin (ASA) chewable tablet 81 mg     atorvastatin (LIPITOR) tablet 10 mg     benzocaine 20% (HURRICAINE/TOPEX) 20 % spray 0.5-1 mL     bisacodyl (DULCOLAX) Suppository 10 mg     cinacalcet (SENSIPAR) tablet 30 mg     [Rx hold ] dapsone (ACZONE) tablet 100 mg     dextrose 10 % 1,000 mL infusion     glucose gel 15-30 g    Or     dextrose 50 % injection 25-50 mL    Or     glucagon injection 1 mg     furosemide (LASIX) injection 80 mg     hydrALAZINE (APRESOLINE) injection 10 mg     HYDROmorphone (DILAUDID) tablet 2-4 mg     levothyroxine (SYNTHROID/LEVOTHROID) half-tab 37.5 mcg     levothyroxine (SYNTHROID/LEVOTHROID) tablet 25 mcg     lidocaine (LMX4) cream     lidocaine 1 % 0.1-1 mL     multivitamin RENAL (NEPHROCAPS/TRIPHROCAPS) capsule 1 capsule     mycophenolate (GENERIC EQUIVALENT) capsule 750 mg     naloxone (NARCAN) injection 0.1-0.4 mg     ondansetron (ZOFRAN) injection 4 mg     opium-belladonna (B&O SUPPRETTES) 30-16.2 MG per suppository 0.5 suppository     oxybutynin (DITROPAN) tablet 5 mg     polyethylene glycol (MIRALAX/GLYCOLAX) Packet 17 g     prochlorperazine (COMPAZINE) injection 5 mg     senna-docusate (SENOKOT-S/PERICOLACE) 8.6-50 MG per tablet 2 tablet     sevelamer (RENVELA) tablet 2,400 mg     simethicone (MYLICON) chewable tablet 80 mg     sodium chloride (PF) 0.9% PF flush 10 mL     sodium chloride (PF) 0.9% PF flush 10-20 mL     sodium chloride 0.45% infusion     sodium chloride 0.9% infusion     tacrolimus (GENERIC EQUIVALENT) capsule 2 mg     valGANciclovir (VALCYTE) tablet 450 mg          no

## 2022-03-28 NOTE — ED PROVIDER NOTE - CLINICAL SUMMARY MEDICAL DECISION MAKING FREE TEXT BOX
76 yr old female with hx of GIST s/o partial gastrectomy, HTN and cholelithiasis presents to ed c/o RUQ pain that radiates to back and shoulder. pt seen by dr lewis and sent in for admission. + nausea, no vomiting.     cholelithiasis- labs, preop- sono, admit

## 2022-03-28 NOTE — H&P ADULT - NSICDXPASTMEDICALHX_GEN_ALL_CORE_FT
PAST MEDICAL HISTORY:  Colitis Diverticulitis    Gastritis     GIST, non-malignant     HTN (hypertension)

## 2022-03-28 NOTE — H&P ADULT - NSHPPHYSICALEXAM_GEN_ALL_CORE
Vital Signs Last 24 Hrs  T(C): 36.3 (28 Mar 2022 19:45), Max: 36.7 (28 Mar 2022 16:54)  T(F): 97.4 (28 Mar 2022 19:45), Max: 98.1 (28 Mar 2022 16:54)  HR: 74 (28 Mar 2022 19:45) (73 - 74)  BP: 152/72 (28 Mar 2022 19:45) (152/72 - 184/80)  BP(mean): --  RR: 18 (28 Mar 2022 19:45) (18 - 19)  SpO2: 100% (28 Mar 2022 19:45) (98% - 100%)    General:  A&Ox3,Appears stated age, No acute distress,  Head: NC/AT  EENT: PERRLA. EOMI. Conjunctiva and sclera clear. Pharynx clear.  Neck: Supple. No JVD  Lungs: CTA B/l. Nonlabored Respirations  CV: +S1S2, RRR  Abdomen: Soft, Nondistended, +RUQ tenderness, no guarding, no rebound. No rock sign  Extremities: Warm and well perfused. 2+ peripheral pulses b/l. Calf soft, nontender b/l. No pedal edema.

## 2022-03-28 NOTE — H&P ADULT - ASSESSMENT
76 yr old female hx of HTN, GIST, s/p partial gastrectomy and cholelithiasis presenting to the ED with 10 days of RUQ pain. Pain is intermittent, severe and radiating to the back with associated nausea without vomiting over the last 10 days.. Pt was seen and examined in the office by Dr. Luna.  . US shows gallstones    Cholelithiasis  Admit to Dr Luna  OR idania for lap татьяна   NPO, IVF , Preop labs,   pain/nausea    HTN  Watch BP and give antihypertensive  Cont home meds when stable-lisinopril     DVT PPx  HSQ

## 2022-03-28 NOTE — PATIENT PROFILE ADULT - FALL HARM RISK - UNIVERSAL INTERVENTIONS
Bed in lowest position, wheels locked, appropriate side rails in place/Call bell, personal items and telephone in reach/Instruct patient to call for assistance before getting out of bed or chair/Non-slip footwear when patient is out of bed/Cassandra to call system/Physically safe environment - no spills, clutter or unnecessary equipment/Purposeful Proactive Rounding/Room/bathroom lighting operational, light cord in reach

## 2022-03-29 ENCOUNTER — TRANSCRIPTION ENCOUNTER (OUTPATIENT)
Age: 77
End: 2022-03-29

## 2022-03-29 ENCOUNTER — RESULT REVIEW (OUTPATIENT)
Age: 77
End: 2022-03-29

## 2022-03-29 LAB
ALBUMIN SERPL ELPH-MCNC: 3.2 G/DL — LOW (ref 3.5–5)
ALP SERPL-CCNC: 87 U/L — SIGNIFICANT CHANGE UP (ref 40–120)
ALT FLD-CCNC: 20 U/L DA — SIGNIFICANT CHANGE UP (ref 10–60)
ANION GAP SERPL CALC-SCNC: 4 MMOL/L — LOW (ref 5–17)
AST SERPL-CCNC: 13 U/L — SIGNIFICANT CHANGE UP (ref 10–40)
BASOPHILS # BLD AUTO: 0.05 K/UL — SIGNIFICANT CHANGE UP (ref 0–0.2)
BASOPHILS NFR BLD AUTO: 0.6 % — SIGNIFICANT CHANGE UP (ref 0–2)
BILIRUB SERPL-MCNC: 0.6 MG/DL — SIGNIFICANT CHANGE UP (ref 0.2–1.2)
BUN SERPL-MCNC: 6 MG/DL — LOW (ref 7–18)
CALCIUM SERPL-MCNC: 8.5 MG/DL — SIGNIFICANT CHANGE UP (ref 8.4–10.5)
CHLORIDE SERPL-SCNC: 111 MMOL/L — HIGH (ref 96–108)
CO2 SERPL-SCNC: 26 MMOL/L — SIGNIFICANT CHANGE UP (ref 22–31)
CREAT SERPL-MCNC: 0.65 MG/DL — SIGNIFICANT CHANGE UP (ref 0.5–1.3)
EGFR: 91 ML/MIN/1.73M2 — SIGNIFICANT CHANGE UP
EOSINOPHIL # BLD AUTO: 0.27 K/UL — SIGNIFICANT CHANGE UP (ref 0–0.5)
EOSINOPHIL NFR BLD AUTO: 3.1 % — SIGNIFICANT CHANGE UP (ref 0–6)
GLUCOSE SERPL-MCNC: 91 MG/DL — SIGNIFICANT CHANGE UP (ref 70–99)
HCT VFR BLD CALC: 35.7 % — SIGNIFICANT CHANGE UP (ref 34.5–45)
HGB BLD-MCNC: 11.9 G/DL — SIGNIFICANT CHANGE UP (ref 11.5–15.5)
IMM GRANULOCYTES NFR BLD AUTO: 0.2 % — SIGNIFICANT CHANGE UP (ref 0–1.5)
LYMPHOCYTES # BLD AUTO: 3.69 K/UL — HIGH (ref 1–3.3)
LYMPHOCYTES # BLD AUTO: 43 % — SIGNIFICANT CHANGE UP (ref 13–44)
MAGNESIUM SERPL-MCNC: 1.8 MG/DL — SIGNIFICANT CHANGE UP (ref 1.6–2.6)
MCHC RBC-ENTMCNC: 27 PG — SIGNIFICANT CHANGE UP (ref 27–34)
MCHC RBC-ENTMCNC: 33.3 GM/DL — SIGNIFICANT CHANGE UP (ref 32–36)
MCV RBC AUTO: 81.1 FL — SIGNIFICANT CHANGE UP (ref 80–100)
MONOCYTES # BLD AUTO: 0.46 K/UL — SIGNIFICANT CHANGE UP (ref 0–0.9)
MONOCYTES NFR BLD AUTO: 5.4 % — SIGNIFICANT CHANGE UP (ref 2–14)
NEUTROPHILS # BLD AUTO: 4.1 K/UL — SIGNIFICANT CHANGE UP (ref 1.8–7.4)
NEUTROPHILS NFR BLD AUTO: 47.7 % — SIGNIFICANT CHANGE UP (ref 43–77)
NRBC # BLD: 0 /100 WBCS — SIGNIFICANT CHANGE UP (ref 0–0)
PHOSPHATE SERPL-MCNC: 3.2 MG/DL — SIGNIFICANT CHANGE UP (ref 2.5–4.5)
PLATELET # BLD AUTO: 248 K/UL — SIGNIFICANT CHANGE UP (ref 150–400)
POTASSIUM SERPL-MCNC: 3.5 MMOL/L — SIGNIFICANT CHANGE UP (ref 3.5–5.3)
POTASSIUM SERPL-SCNC: 3.5 MMOL/L — SIGNIFICANT CHANGE UP (ref 3.5–5.3)
PROT SERPL-MCNC: 6.3 G/DL — SIGNIFICANT CHANGE UP (ref 6–8.3)
RBC # BLD: 4.4 M/UL — SIGNIFICANT CHANGE UP (ref 3.8–5.2)
RBC # FLD: 13.9 % — SIGNIFICANT CHANGE UP (ref 10.3–14.5)
SODIUM SERPL-SCNC: 141 MMOL/L — SIGNIFICANT CHANGE UP (ref 135–145)
WBC # BLD: 8.59 K/UL — SIGNIFICANT CHANGE UP (ref 3.8–10.5)
WBC # FLD AUTO: 8.59 K/UL — SIGNIFICANT CHANGE UP (ref 3.8–10.5)

## 2022-03-29 PROCEDURE — 88304 TISSUE EXAM BY PATHOLOGIST: CPT | Mod: 26

## 2022-03-29 PROCEDURE — 47563 LAPARO CHOLECYSTECTOMY/GRAPH: CPT | Mod: AS

## 2022-03-29 PROCEDURE — 99223 1ST HOSP IP/OBS HIGH 75: CPT

## 2022-03-29 PROCEDURE — 47563 LAPARO CHOLECYSTECTOMY/GRAPH: CPT

## 2022-03-29 DEVICE — CLIP APPLIER COVIDIEN ENDOCLIP II 10MM MED/LG: Type: IMPLANTABLE DEVICE | Status: FUNCTIONAL

## 2022-03-29 DEVICE — CHOLGM ST KARLAN CATH 4FR 60CM: Type: IMPLANTABLE DEVICE | Status: FUNCTIONAL

## 2022-03-29 RX ORDER — SODIUM CHLORIDE 9 MG/ML
1000 INJECTION, SOLUTION INTRAVENOUS
Refills: 0 | Status: DISCONTINUED | OUTPATIENT
Start: 2022-03-29 | End: 2022-03-30

## 2022-03-29 RX ORDER — MORPHINE SULFATE 50 MG/1
2 CAPSULE, EXTENDED RELEASE ORAL EVERY 6 HOURS
Refills: 0 | Status: DISCONTINUED | OUTPATIENT
Start: 2022-03-29 | End: 2022-03-30

## 2022-03-29 RX ORDER — HYDROMORPHONE HYDROCHLORIDE 2 MG/ML
1 INJECTION INTRAMUSCULAR; INTRAVENOUS; SUBCUTANEOUS
Refills: 0 | Status: DISCONTINUED | OUTPATIENT
Start: 2022-03-29 | End: 2022-03-29

## 2022-03-29 RX ORDER — ACETAMINOPHEN 500 MG
650 TABLET ORAL EVERY 6 HOURS
Refills: 0 | Status: DISCONTINUED | OUTPATIENT
Start: 2022-03-29 | End: 2022-03-30

## 2022-03-29 RX ORDER — HEPARIN SODIUM 5000 [USP'U]/ML
5000 INJECTION INTRAVENOUS; SUBCUTANEOUS EVERY 8 HOURS
Refills: 0 | Status: DISCONTINUED | OUTPATIENT
Start: 2022-03-30 | End: 2022-03-30

## 2022-03-29 RX ORDER — FENTANYL CITRATE 50 UG/ML
25 INJECTION INTRAVENOUS
Refills: 0 | Status: DISCONTINUED | OUTPATIENT
Start: 2022-03-29 | End: 2022-03-29

## 2022-03-29 RX ORDER — OXYCODONE AND ACETAMINOPHEN 5; 325 MG/1; MG/1
1 TABLET ORAL EVERY 6 HOURS
Refills: 0 | Status: DISCONTINUED | OUTPATIENT
Start: 2022-03-29 | End: 2022-03-30

## 2022-03-29 RX ADMIN — ONDANSETRON 4 MILLIGRAM(S): 8 TABLET, FILM COATED ORAL at 21:42

## 2022-03-29 RX ADMIN — HYDROMORPHONE HYDROCHLORIDE 1 MILLIGRAM(S): 2 INJECTION INTRAMUSCULAR; INTRAVENOUS; SUBCUTANEOUS at 20:45

## 2022-03-29 RX ADMIN — HYDROMORPHONE HYDROCHLORIDE 1 MILLIGRAM(S): 2 INJECTION INTRAMUSCULAR; INTRAVENOUS; SUBCUTANEOUS at 20:13

## 2022-03-29 RX ADMIN — HEPARIN SODIUM 5000 UNIT(S): 5000 INJECTION INTRAVENOUS; SUBCUTANEOUS at 15:21

## 2022-03-29 NOTE — CHART NOTE - NSCHARTNOTEFT_GEN_A_CORE
Pt POD 0 s/p lap татьяна  resting comfortably  no n/v  voided    Vital Signs Last 24 Hrs  T(C): 36.4 (29 Mar 2022 21:43), Max: 36.9 (29 Mar 2022 05:19)  T(F): 97.5 (29 Mar 2022 21:43), Max: 98.5 (29 Mar 2022 05:19)  HR: 56 (29 Mar 2022 21:43) (52 - 89)  BP: 145/55 (29 Mar 2022 21:43) (104/48 - 155/68)  BP(mean): 77 (29 Mar 2022 21:43) (62 - 78)  RR: 18 (29 Mar 2022 21:43) (11 - 20)  SpO2: 100% (29 Mar 2022 21:43) (96% - 100%)    abd soft, inc/dsg CDI    stable post-op

## 2022-03-29 NOTE — CONSULT NOTE ADULT - ASSESSMENT
76 yr old female hx of HTN, GIST, s/p partial gastrectomy and cholelithiasis presenting to the ED with 10 days of RUQ pain. Patient admitted for cholilithiasis requiring surgery.    -Cholelithiasis  - Hypertension  - hx of GIST s/p resection      Cholelithiasis:    Patient presented with RUQ pain with nausea and vomiting. normal WBC afebrile.  US abdomen noted for Chololithiasis with out evidence of cholecystitis.  Patient states able o carry out > 4 mets of activity, denies any active chest pain.  Patient doesn't have any active arrythmia or valvular abnormality.  EKG showed NSR @64 BPM, with not specific ST segment changes, mostly unchanged from EKG from jan 2021.  RCRI 1 points,Class II Risk 6.0 % 30-day risk of death, MI, or cardiac arrest.  Mederos 0.2 %, Risk of myocardial infarction or cardiac arrest, intraoperatively or up to 30 days post-op    Patient is at low risk for high risk intraabdominal surgery. Patient is medically optimized surgery. no further inpatient cardiac workup warranted.      Hypertension:    Patient states she takes lisinopril at home, for now would recommend to hold perioperatively as blood pressure is stable.  Can resume later as indicated.         76 yr old female hx of HTN, GIST, s/p partial gastrectomy and cholelithiasis presenting to the ED with 10 days of RUQ pain. Patient admitted for cholilithiasis requiring surgery.    -Cholelithiasis  - Hypertension  - hx of GIST s/p resection      #Cholelithiasis/ rule out cholecystitis    Patient presented with RUQ pain with nausea and vomiting. normal WBC afebrile.  US abdomen noted for Chololithiasis with out evidence of cholecystitis.  Patient states able o carry out > 4 mets of activity, denies any active chest pain.  Patient doesn't have any active arrythmia or valvular abnormality.  EKG showed NSR @64 BPM, with non specific ST segment changes, mostly unchanged from EKG from jan 2021.  RCRI 1 points,Class II Risk 6.0 % 30-day risk of death, MI, or cardiac arrest.  Mederos 0.2 %, Risk of myocardial infarction or cardiac arrest, intraoperatively or up to 30 days post-op  Patient is at low to moderate risk for intermediate risk non cardiac intraabdominal surgery. Patient is medically optimized surgery. No further inpatient cardiac workup warranted.      #Hypertension:  Patient states she takes lisinopril at home, for now would recommend to hold perioperatively as blood pressure is stable.  Can resume later as indicated.

## 2022-03-29 NOTE — CONSULT NOTE ADULT - ATTENDING COMMENTS
Pt seen at bedside  Case discussed with MAR.  76 year old woman with PMH of HTN, GIST s/p partial gastrectomy admitted to the General Surgery team for elective cholecystectomy. Hospitalist team consulted for pre-op evaluation and optimization.    She has no hx of CVA, no CHF, CAD, CKD or DM. She has well controlled HTN. She is independent of her activities of daily living. She had no prior complications with anesthesia or with past surgery.    Vital Signs Last 24 Hrs  T(C): 36.9 (29 Mar 2022 05:19), Max: 36.9 (29 Mar 2022 05:19)  T(F): 98.5 (29 Mar 2022 05:19), Max: 98.5 (29 Mar 2022 05:19)  HR: 62 (29 Mar 2022 05:19) (62 - 89)  BP: 133/56 (29 Mar 2022 05:19) (133/56 - 184/80)  RR: 16 (29 Mar 2022 05:19) (16 - 19)  SpO2: 97% (29 Mar 2022 05:19) (97% - 100%)    Exams - unremarkable     Labs                         11.9   8.59  )-----------( 248      ( 29 Mar 2022 05:47 )             35.7     03-29    141  |  111<H>  |  6<L>  ----------------------------<  91  3.5   |  26  |  0.65    Ca    8.5      29 Mar 2022 05:47  Phos  3.2     03-29  Mg     1.8     03-29    TPro  6.3  /  Alb  3.2<L>  /  TBili  0.6  /  DBili  x   /  AST  13  /  ALT  20  /  AlkPhos  87  03-29    INR- 1.02    US RUQ - cholelithiasis    Impression   76 year old woman with PMH as above presenting with RUQ pain and scheduled for elective cholecystectomy today. She has a history of well controlled HTN with ASA II ; normal ADLs and meets activities equal to or > 4 .     Recommendation   - IVF hydration, pain control and DVT ppx per primary team  - Continue BP meds after surgery planned for today   - NO further testing or intervention necessary.    Pre-op evaluation   Based on the history and scoring using the RCRI and Mederos system which measures risk of cardiac events within the perioperative period, she has a low to moderate risk of a cardiac event while undergoing this non-cardiac intermediate risk procedure.

## 2022-03-29 NOTE — CONSULT NOTE ADULT - SUBJECTIVE AND OBJECTIVE BOX
ALISON LEOS  76y  Female      HPI:  76 yr old female hx of HTN, GIST, s/p partial gastrectomy and cholelithiasis presenting to the ED with 10 days of RUQ pain. Pain is intermittent, severe and radiating to the back with associated nausea without vomiting over the last 10 days. Denies fever and chills . Pt was seen and examined in the office by Dr. Luna.  No other acute complaints at this time.    (28 Mar 2022 20:52)    Additional hx: Patient states she has been having nausea and vomiting specially afte she eats, also she complained of 8/10 Ruq pain for past few days. Patient states that she only takes lisinopril at home, also added that she was started on metoprolol before but doctor has stopped it almost 6 months ago. Patient denies any shortness of breath, palpiations, CAD fever, diarrhea , head ache, or any other complains. Patient also states that she can walk a block with out getting short of breath and carry out activities of daily living by her self.          PAST MEDICAL/SURGICAL HISTORY  PAST MEDICAL & SURGICAL HISTORY:  HTN (hypertension)    Gastritis    Colitis  Diverticulitis    GIST, non-malignant    H/O varicose vein stripping  20 yrs ago    S/P partial gastrectomy    Social Hx: denies tobacco or Alcohol use , lives with       REVIEW OF SYSTEMS:  CONSTITUTIONAL: No fever, weight loss, or fatigue  EYES: No eye pain, visual disturbances, or discharge  ENMT:  No difficulty hearing, tinnitus, vertigo; No sinus or throat pain  NECK: No pain or stiffness  BREASTS: No pain, masses, or nipple discharge  RESPIRATORY: No cough, wheezing, chills or hemoptysis; No shortness of breath  CARDIOVASCULAR: No chest pain, palpitations, dizziness, or leg swelling  GASTROINTESTINAL: No abdominal or epigastric pain. No nausea, vomiting, or hematemesis; No diarrhea or constipation. No melena or hematochezia.  GENITOURINARY: No dysuria, frequency, hematuria, or incontinence  NEUROLOGICAL: No headaches, memory loss, loss of strength, numbness, or tremors  SKIN: No itching, burning, rashes, or lesions   LYMPH NODES: No enlarged glands  ENDOCRINE: No heat or cold intolerance; No hair loss  MUSCULOSKELETAL: No joint pain or swelling; No muscle, back, or extremity pain  PSYCHIATRIC: No depression, anxiety, mood swings, or difficulty sleeping  HEME/LYMPH: No easy bruising, or bleeding gums  ALLERY AND IMMUNOLOGIC: No hives or eczema    T(C): 36.9 (03-29-22 @ 05:19), Max: 36.9 (03-29-22 @ 05:19)  HR: 62 (03-29-22 @ 05:19) (62 - 89)  BP: 133/56 (03-29-22 @ 05:19) (133/56 - 184/80)  RR: 16 (03-29-22 @ 05:19) (16 - 19)  SpO2: 97% (03-29-22 @ 05:19) (97% - 100%)  Wt(kg): --Vital Signs Last 24 Hrs  T(C): 36.9 (29 Mar 2022 05:19), Max: 36.9 (29 Mar 2022 05:19)  T(F): 98.5 (29 Mar 2022 05:19), Max: 98.5 (29 Mar 2022 05:19)  HR: 62 (29 Mar 2022 05:19) (62 - 89)  BP: 133/56 (29 Mar 2022 05:19) (133/56 - 184/80)  BP(mean): --  RR: 16 (29 Mar 2022 05:19) (16 - 19)  SpO2: 97% (29 Mar 2022 05:19) (97% - 100%)    PHYSICAL EXAM:  GENERAL: NAD, well-groomed, well-developed  HEAD:  Atraumatic, Normocephalic  EYES: EOMI, PERRLA, conjunctiva and sclera clear  ENMT: No tonsillar erythema, exudates, or enlargement; Moist mucous membranes, Good dentition, No lesions  NECK: Supple, No JVD, Normal thyroid  NERVOUS SYSTEM:  Alert & Oriented X3, Good concentration; Motor Strength 5/5 B/L upper and lower extremities; DTRs 2+ intact and symmetric  CHEST/LUNG: Clear to percussion bilaterally; No rales, rhonchi, wheezing, or rubs  HEART: Regular rate and rhythm; No murmurs, rubs, or gallops  ABDOMEN: Soft, +ve RUQ tenderness, Nondistended; Bowel sounds present  EXTREMITIES:  2+ Peripheral Pulses, No clubbing, cyanosis, or edema  LYMPH: No lymphadenopathy noted  SKIN: No rashes or lesions    Consultant(s) Notes Reviewed:  [x ] YES  [ ] NO  Care Discussed with Consultants/Other Providers [ x] YES  [ ] NO    LABS:  CBC   03-29-22 @ 05:47  Hematcorit 35.7  Hemoglobin 11.9  Mean Cell Hemoglobin 27.0  Platelet Count-Automated 248  RBC Count 4.40  Red Cell Distrib Width 13.9  Wbc Count 8.59  03-28-22 @ 18:13  Hematcorit 37.2  Hemoglobin 12.3  Mean Cell Hemoglobin 26.9  Platelet Count-Automated 307  RBC Count 4.58  Red Cell Distrib Width 13.8  Wbc Count 10.49      BMP  03-29-22 @ 05:47  Anion Gap. Serum 4  Blood Urea Nitrogen,Serm 6  Calcium, Total Serum 8.5  Carbon Dioxide, Serum 26  Chloride, Serum 111  Creatinine, Serum 0.65  eGFR in  --  eGFR in Non Afican American --  Gloucose, serum 91  Potassium, Serum 3.5  Sodium, Serum 141              03-28-22 @ 18:13  Anion Gap. Serum 6  Blood Urea Nitrogen,Serm 8  Calcium, Total Serum 9.1  Carbon Dioxide, Serum 27  Chloride, Serum 107  Creatinine, Serum 0.82  eGFR in  --  eGFR in Non Afican American --  Gloucose, serum 100  Potassium, Serum 3.9  Sodium, Serum 140                  CMP  03-29-22 @ 05:47  Mica Aminotransferase(ALT/SGPT)20  Albumin, Serum 3.2  Alkaline Phosphatase, Serum 87  Anion Gap, Serum 4  Aspartate Aminotransferase (AST/SGOT)13  Bilirubin Total, Serum 0.6  Blood Urea Nitrogen, Serum 6  Calcium,Total Serum 8.5  Carbon Dioxide, Serum 26  Chloride, Serum 111  Creatinine, Serum 0.65  eGFR if  --  eGFR if Non African American --  Glucose, Serum 91  Potassium, Serum 3.5  Protein Total, Serum 6.3  Sodium, Serum 141                      03-28-22 @ 18:13  Mica Aminotransferase(ALT/SGPT)22  Albumin, Serum 3.9  Alkaline Phosphatase, Serum 99  Anion Gap, Serum 6  Aspartate Aminotransferase (AST/SGOT)16  Bilirubin Total, Serum 0.8  Blood Urea Nitrogen, Serum 8  Calcium,Total Serum 9.1  Carbon Dioxide, Serum 27  Chloride, Serum 107  Creatinine, Serum 0.82  eGFR if  --  eGFR if Non African American --  Glucose, Serum 100  Potassium, Serum 3.9  Protein Total, Serum 7.2  Sodium, Serum 140                          PT/INR  PT/INR  03-28-22 @ 18:13  INR 1.02  Prothrombin Time Comment --  Prothrobin Time, [id number]      Amylase/Lipase  03-28-22 @ 18:13  Amylase, Serum Total --  Lipase, Serum 168            RADIOLOGY & ADDITIONAL TESTS:    Imaging Personally Reviewed:  [ ] YES  [ ] NO ALISON LEOS  76y  Female      HPI:  76 yr old female hx of HTN, GIST, s/p partial gastrectomy and cholelithiasis presenting to the ED with 10 days of RUQ pain. Pain is intermittent, severe and radiating to the back with associated nausea without vomiting over the last 10 days. Denies fever and chills . Pt was seen and examined in the office by Dr. Luna.  No other acute complaints at this time.    (28 Mar 2022 20:52)    Additional hx: Patient states she has been having nausea and vomiting specially afte she eats, also she complained of 8/10 Ruq pain for past few days. Patient states that she only takes lisinopril at home, also added that she was started on metoprolol before but doctor has stopped it almost 6 months ago. Patient denies any shortness of breath, palpiations, fever, diarrhea , head ache, or any other complains. Patient also states that she can walk a block with out getting short of breath and carry out activities of daily living by her self.          PAST MEDICAL/SURGICAL HISTORY  PAST MEDICAL & SURGICAL HISTORY:  HTN (hypertension)    Gastritis    Colitis  Diverticulitis    GIST, non-malignant    H/O varicose vein stripping  20 yrs ago    S/P partial gastrectomy    Social Hx: denies tobacco or Alcohol use , lives with       REVIEW OF SYSTEMS:  CONSTITUTIONAL: No fever, weight loss, or fatigue  EYES: No eye pain, visual disturbances, or discharge  ENMT:  No difficulty hearing, tinnitus, vertigo; No sinus or throat pain  NECK: No pain or stiffness  BREASTS: No pain, masses, or nipple discharge  RESPIRATORY: No cough, wheezing, chills or hemoptysis; No shortness of breath  CARDIOVASCULAR: No chest pain, palpitations, dizziness, or leg swelling  GASTROINTESTINAL: No abdominal or epigastric pain. No nausea, vomiting, or hematemesis; No diarrhea or constipation. No melena or hematochezia.  GENITOURINARY: No dysuria, frequency, hematuria, or incontinence  NEUROLOGICAL: No headaches, memory loss, loss of strength, numbness, or tremors  SKIN: No itching, burning, rashes, or lesions   LYMPH NODES: No enlarged glands  ENDOCRINE: No heat or cold intolerance; No hair loss  MUSCULOSKELETAL: No joint pain or swelling; No muscle, back, or extremity pain  PSYCHIATRIC: No depression, anxiety, mood swings, or difficulty sleeping  HEME/LYMPH: No easy bruising, or bleeding gums  ALLERY AND IMMUNOLOGIC: No hives or eczema    T(C): 36.9 (03-29-22 @ 05:19), Max: 36.9 (03-29-22 @ 05:19)  HR: 62 (03-29-22 @ 05:19) (62 - 89)  BP: 133/56 (03-29-22 @ 05:19) (133/56 - 184/80)  RR: 16 (03-29-22 @ 05:19) (16 - 19)  SpO2: 97% (03-29-22 @ 05:19) (97% - 100%)  Wt(kg): --Vital Signs Last 24 Hrs  T(C): 36.9 (29 Mar 2022 05:19), Max: 36.9 (29 Mar 2022 05:19)  T(F): 98.5 (29 Mar 2022 05:19), Max: 98.5 (29 Mar 2022 05:19)  HR: 62 (29 Mar 2022 05:19) (62 - 89)  BP: 133/56 (29 Mar 2022 05:19) (133/56 - 184/80)  BP(mean): --  RR: 16 (29 Mar 2022 05:19) (16 - 19)  SpO2: 97% (29 Mar 2022 05:19) (97% - 100%)    PHYSICAL EXAM:  GENERAL: NAD, well-groomed, well-developed  HEAD:  Atraumatic, Normocephalic  EYES: EOMI, PERRLA, conjunctiva and sclera clear  ENMT: No tonsillar erythema, exudates, or enlargement; Moist mucous membranes, Good dentition, No lesions  NECK: Supple, No JVD, Normal thyroid  NERVOUS SYSTEM:  Alert & Oriented X3, Good concentration; Motor Strength 5/5 B/L upper and lower extremities; DTRs 2+ intact and symmetric  CHEST/LUNG: Clear to percussion bilaterally; No rales, rhonchi, wheezing, or rubs  HEART: Regular rate and rhythm; No murmurs, rubs, or gallops  ABDOMEN: Soft, +ve RUQ tenderness, Nondistended; Bowel sounds present  EXTREMITIES:  2+ Peripheral Pulses, No clubbing, cyanosis, or edema  LYMPH: No lymphadenopathy noted  SKIN: No rashes or lesions    Consultant(s) Notes Reviewed:  [x ] YES  [ ] NO  Care Discussed with Consultants/Other Providers [ x] YES  [ ] NO    LABS:  CBC   03-29-22 @ 05:47  Hematcorit 35.7  Hemoglobin 11.9  Mean Cell Hemoglobin 27.0  Platelet Count-Automated 248  RBC Count 4.40  Red Cell Distrib Width 13.9  Wbc Count 8.59  03-28-22 @ 18:13  Hematcorit 37.2  Hemoglobin 12.3  Mean Cell Hemoglobin 26.9  Platelet Count-Automated 307  RBC Count 4.58  Red Cell Distrib Width 13.8  Wbc Count 10.49      BMP  03-29-22 @ 05:47  Anion Gap. Serum 4  Blood Urea Nitrogen,Serm 6  Calcium, Total Serum 8.5  Carbon Dioxide, Serum 26  Chloride, Serum 111  Creatinine, Serum 0.65  eGFR in  --  eGFR in Non Afican American --  Gloucose, serum 91  Potassium, Serum 3.5  Sodium, Serum 141              03-28-22 @ 18:13  Anion Gap. Serum 6  Blood Urea Nitrogen,Serm 8  Calcium, Total Serum 9.1  Carbon Dioxide, Serum 27  Chloride, Serum 107  Creatinine, Serum 0.82  eGFR in  --  eGFR in Non Afican American --  Gloucose, serum 100  Potassium, Serum 3.9  Sodium, Serum 140                  CMP  03-29-22 @ 05:47  Mica Aminotransferase(ALT/SGPT)20  Albumin, Serum 3.2  Alkaline Phosphatase, Serum 87  Anion Gap, Serum 4  Aspartate Aminotransferase (AST/SGOT)13  Bilirubin Total, Serum 0.6  Blood Urea Nitrogen, Serum 6  Calcium,Total Serum 8.5  Carbon Dioxide, Serum 26  Chloride, Serum 111  Creatinine, Serum 0.65  eGFR if  --  eGFR if Non African American --  Glucose, Serum 91  Potassium, Serum 3.5  Protein Total, Serum 6.3  Sodium, Serum 141                      03-28-22 @ 18:13  Mica Aminotransferase(ALT/SGPT)22  Albumin, Serum 3.9  Alkaline Phosphatase, Serum 99  Anion Gap, Serum 6  Aspartate Aminotransferase (AST/SGOT)16  Bilirubin Total, Serum 0.8  Blood Urea Nitrogen, Serum 8  Calcium,Total Serum 9.1  Carbon Dioxide, Serum 27  Chloride, Serum 107  Creatinine, Serum 0.82  eGFR if  --  eGFR if Non African American --  Glucose, Serum 100  Potassium, Serum 3.9  Protein Total, Serum 7.2  Sodium, Serum 140                          PT/INR  PT/INR  03-28-22 @ 18:13  INR 1.02  Prothrombin Time Comment --  Prothrobin Time, [id number]      Amylase/Lipase  03-28-22 @ 18:13  Amylase, Serum Total --  Lipase, Serum 168            RADIOLOGY & ADDITIONAL TESTS:    Imaging Personally Reviewed:  [ ] YES  [ ] NO ALISON LEOS  76y  Female      HPI:  76 yr old female hx of HTN, GIST, s/p partial gastrectomy and cholelithiasis presenting to the ED with 10 days of RUQ pain. Pain is intermittent, severe and radiating to the back with associated nausea without vomiting over the last 10 days. Denies fever and chills . Pt was seen and examined in the office by Dr. Luna.  No other acute complaints at this time.    (28 Mar 2022 20:52)    Additional hx: Patient states she has been having nausea and vomiting specially afte she eats, also she complained of 8/10 Ruq pain for past few days. Patient states that she only takes lisinopril at home, also added that she was started on metoprolol before but doctor has stopped it almost 6 months ago. Patient denies any shortness of breath, palpiations, fever, diarrhea , head ache, or any other complains. Patient also states that she can walk a block with out getting short of breath and carry out activities of daily living by her self.    PAST MEDICAL & SURGICAL HISTORY:  HTN (hypertension)  Gastritis /Colitis  Diverticulitis    GIST, non-malignant s/p surgery    H/O varicose vein stripping  20 years ago    S/P partial gastrectomy    Social Hx: denies tobacco or Alcohol use , lives with       REVIEW OF SYSTEMS:  CONSTITUTIONAL: No fever, weight loss, or fatigue  EYES: No eye pain, visual disturbances, or discharge  ENMT:  No difficulty hearing, tinnitus, vertigo; No sinus or throat pain  NECK: No pain or stiffness  BREASTS: No pain, masses, or nipple discharge  RESPIRATORY: No cough, wheezing, chills or hemoptysis; No shortness of breath  CARDIOVASCULAR: No chest pain, palpitations, dizziness, or leg swelling  GASTROINTESTINAL: No abdominal or epigastric pain. No nausea, vomiting, or hematemesis; No diarrhea or constipation. No melena or hematochezia.  GENITOURINARY: No dysuria, frequency, hematuria, or incontinence  NEUROLOGICAL: No headaches, memory loss, loss of strength, numbness, or tremors  SKIN: No itching, burning, rashes, or lesions   LYMPH NODES: No enlarged glands  ENDOCRINE: No heat or cold intolerance; No hair loss  MUSCULOSKELETAL: No joint pain or swelling; No muscle, back, or extremity pain  PSYCHIATRIC: No depression, anxiety, mood swings, or difficulty sleeping  HEME/LYMPH: No easy bruising, or bleeding gums  ALLERGY AND IMMUNOLOGIC: No hives or eczema    T(C): 36.9 (03-29-22 @ 05:19), Max: 36.9 (03-29-22 @ 05:19)  HR: 62 (03-29-22 @ 05:19) (62 - 89)  BP: 133/56 (03-29-22 @ 05:19) (133/56 - 184/80)  RR: 16 (03-29-22 @ 05:19) (16 - 19)  SpO2: 97% (03-29-22 @ 05:19) (97% - 100%)  Wt(kg): --    Vital Signs Last 24 Hrs  T(C): 36.9 (29 Mar 2022 05:19), Max: 36.9 (29 Mar 2022 05:19)  T(F): 98.5 (29 Mar 2022 05:19), Max: 98.5 (29 Mar 2022 05:19)  HR: 62 (29 Mar 2022 05:19) (62 - 89)  BP: 133/56 (29 Mar 2022 05:19) (133/56 - 184/80)  RR: 16 (29 Mar 2022 05:19) (16 - 19)  SpO2: 97% (29 Mar 2022 05:19) (97% - 100%)    PHYSICAL EXAM:  GENERAL: NAD, well-groomed, well-developed  HEAD:  Atraumatic, Normocephalic  EYES: EOMI, PERRLA, conjunctiva and sclera clear  ENMT: No tonsillar erythema, exudates, or enlargement; Moist mucous membranes, Good dentition, No lesions  NECK: Supple, No JVD, Normal thyroid  NERVOUS SYSTEM:  Alert & Oriented X3, Good concentration; Motor Strength 5/5 B/L upper and lower extremities; DTRs 2+ intact and symmetric  CHEST/LUNG: Clear to percussion bilaterally; No rales, rhonchi, wheezing, or rubs  HEART: Regular rate and rhythm; No murmurs, rubs, or gallops  ABDOMEN: Soft, +ve RUQ tenderness, Nondistended; Bowel sounds present  EXTREMITIES:  2+ Peripheral Pulses, No clubbing, cyanosis, or edema  LYMPH: No lymphadenopathy noted  SKIN: No rashes or lesions    Consultant(s) Notes Reviewed:  [x ] YES  [ ] NO  Care Discussed with Consultants/Other Providers [ x] YES  [ ] NO    LABS:  CBC   03-29-22 @ 05:47  Hematocrit 35.7  Hemoglobin 11.9  Mean Cell Hemoglobin 27.0  Platelet Count-Automated 248  RBC Count 4.40  Red Cell Distrib Width 13.9  Wbc Count 8.59  03-28-22 @ 18:13  Hematocrit 37.2  Hemoglobin 12.3  Mean Cell Hemoglobin 26.9  Platelet Count-Automated 307  RBC Count 4.58  Red Cell Distrib Width 13.8  Wbc Count 10.49      BMP  03-29-22 @ 05:47  Anion Gap. Serum 4  Blood Urea Nitrogen, Serm 6  Calcium, Total Serum 8.5  Carbon Dioxide, Serum 26  Chloride, Serum 111  Creatinine, Serum 0.65  eGFR in  --  eGFR in Non Afican American --  Gloucose, serum 91  Potassium, Serum 3.5  Sodium, Serum 141              03-28-22 @ 18:13  Anion Gap. Serum 6  Blood Urea Nitrogen,Serm 8  Calcium, Total Serum 9.1  Carbon Dioxide, Serum 27  Chloride, Serum 107  Creatinine, Serum 0.82  eGFR in  --  eGFR in Non Afican American --  Gloucose, serum 100  Potassium, Serum 3.9  Sodium, Serum 140                  CMP  03-29-22 @ 05:47  Mica Aminotransferase(ALT/SGPT)20  Albumin, Serum 3.2  Alkaline Phosphatase, Serum 87  Anion Gap, Serum 4  Aspartate Aminotransferase (AST/SGOT)13  Bilirubin Total, Serum 0.6  Blood Urea Nitrogen, Serum 6  Calcium,Total Serum 8.5  Carbon Dioxide, Serum 26  Chloride, Serum 111  Creatinine, Serum 0.65  eGFR if  --  eGFR if Non African American --  Glucose, Serum 91  Potassium, Serum 3.5  Protein Total, Serum 6.3  Sodium, Serum 141                      03-28-22 @ 18:13  Mica Aminotransferase(ALT/SGPT)22  Albumin, Serum 3.9  Alkaline Phosphatase, Serum 99  Anion Gap, Serum 6  Aspartate Aminotransferase (AST/SGOT)16  Bilirubin Total, Serum 0.8  Blood Urea Nitrogen, Serum 8  Calcium,Total Serum 9.1  Carbon Dioxide, Serum 27  Chloride, Serum 107  Creatinine, Serum 0.82  eGFR if  --  eGFR if Non African American --  Glucose, Serum 100  Potassium, Serum 3.9  Protein Total, Serum 7.2  Sodium, Serum 140                          PT/INR  PT/INR  03-28-22 @ 18:13  INR 1.02  Prothrombin Time Comment --  Prothrobin Time, [id number]      Amylase/Lipase  03-28-22 @ 18:13  Amylase, Serum Total --  Lipase, Serum 168            RADIOLOGY & ADDITIONAL TESTS:    Imaging Personally Reviewed:  [ ] YES  [ ] NO

## 2022-03-29 NOTE — BRIEF OPERATIVE NOTE - NSICDXBRIEFPROCEDURE_GEN_ALL_CORE_FT
PROCEDURES:  Laparoscopic cholecystectomy with cholangiography 29-Mar-2022 18:47:06  Nannette Gilliam

## 2022-03-30 ENCOUNTER — TRANSCRIPTION ENCOUNTER (OUTPATIENT)
Age: 77
End: 2022-03-30

## 2022-03-30 VITALS
SYSTOLIC BLOOD PRESSURE: 151 MMHG | DIASTOLIC BLOOD PRESSURE: 60 MMHG | HEART RATE: 61 BPM | TEMPERATURE: 99 F | OXYGEN SATURATION: 100 % | RESPIRATION RATE: 17 BRPM

## 2022-03-30 LAB
ALBUMIN SERPL ELPH-MCNC: 3.4 G/DL — LOW (ref 3.5–5)
ALP SERPL-CCNC: 89 U/L — SIGNIFICANT CHANGE UP (ref 40–120)
ALT FLD-CCNC: 27 U/L DA — SIGNIFICANT CHANGE UP (ref 10–60)
ANION GAP SERPL CALC-SCNC: 8 MMOL/L — SIGNIFICANT CHANGE UP (ref 5–17)
AST SERPL-CCNC: 27 U/L — SIGNIFICANT CHANGE UP (ref 10–40)
BASOPHILS # BLD AUTO: 0.01 K/UL — SIGNIFICANT CHANGE UP (ref 0–0.2)
BASOPHILS NFR BLD AUTO: 0.1 % — SIGNIFICANT CHANGE UP (ref 0–2)
BILIRUB SERPL-MCNC: 0.8 MG/DL — SIGNIFICANT CHANGE UP (ref 0.2–1.2)
BUN SERPL-MCNC: 7 MG/DL — SIGNIFICANT CHANGE UP (ref 7–18)
CALCIUM SERPL-MCNC: 9.1 MG/DL — SIGNIFICANT CHANGE UP (ref 8.4–10.5)
CHLORIDE SERPL-SCNC: 104 MMOL/L — SIGNIFICANT CHANGE UP (ref 96–108)
CO2 SERPL-SCNC: 24 MMOL/L — SIGNIFICANT CHANGE UP (ref 22–31)
CREAT SERPL-MCNC: 0.66 MG/DL — SIGNIFICANT CHANGE UP (ref 0.5–1.3)
EGFR: 90 ML/MIN/1.73M2 — SIGNIFICANT CHANGE UP
EOSINOPHIL # BLD AUTO: 0 K/UL — SIGNIFICANT CHANGE UP (ref 0–0.5)
EOSINOPHIL NFR BLD AUTO: 0 % — SIGNIFICANT CHANGE UP (ref 0–6)
GLUCOSE SERPL-MCNC: 129 MG/DL — HIGH (ref 70–99)
HCT VFR BLD CALC: 36.3 % — SIGNIFICANT CHANGE UP (ref 34.5–45)
HGB BLD-MCNC: 12.1 G/DL — SIGNIFICANT CHANGE UP (ref 11.5–15.5)
IMM GRANULOCYTES NFR BLD AUTO: 0.4 % — SIGNIFICANT CHANGE UP (ref 0–1.5)
LYMPHOCYTES # BLD AUTO: 1.82 K/UL — SIGNIFICANT CHANGE UP (ref 1–3.3)
LYMPHOCYTES # BLD AUTO: 14.7 % — SIGNIFICANT CHANGE UP (ref 13–44)
MCHC RBC-ENTMCNC: 26.9 PG — LOW (ref 27–34)
MCHC RBC-ENTMCNC: 33.3 GM/DL — SIGNIFICANT CHANGE UP (ref 32–36)
MCV RBC AUTO: 80.7 FL — SIGNIFICANT CHANGE UP (ref 80–100)
MONOCYTES # BLD AUTO: 0.22 K/UL — SIGNIFICANT CHANGE UP (ref 0–0.9)
MONOCYTES NFR BLD AUTO: 1.8 % — LOW (ref 2–14)
NEUTROPHILS # BLD AUTO: 10.27 K/UL — HIGH (ref 1.8–7.4)
NEUTROPHILS NFR BLD AUTO: 83 % — HIGH (ref 43–77)
NRBC # BLD: 0 /100 WBCS — SIGNIFICANT CHANGE UP (ref 0–0)
PLATELET # BLD AUTO: 266 K/UL — SIGNIFICANT CHANGE UP (ref 150–400)
POTASSIUM SERPL-MCNC: 4 MMOL/L — SIGNIFICANT CHANGE UP (ref 3.5–5.3)
POTASSIUM SERPL-SCNC: 4 MMOL/L — SIGNIFICANT CHANGE UP (ref 3.5–5.3)
PROT SERPL-MCNC: 6.6 G/DL — SIGNIFICANT CHANGE UP (ref 6–8.3)
RBC # BLD: 4.5 M/UL — SIGNIFICANT CHANGE UP (ref 3.8–5.2)
RBC # FLD: 14 % — SIGNIFICANT CHANGE UP (ref 10.3–14.5)
SODIUM SERPL-SCNC: 136 MMOL/L — SIGNIFICANT CHANGE UP (ref 135–145)
WBC # BLD: 12.37 K/UL — HIGH (ref 3.8–10.5)
WBC # FLD AUTO: 12.37 K/UL — HIGH (ref 3.8–10.5)

## 2022-03-30 PROCEDURE — 88304 TISSUE EXAM BY PATHOLOGIST: CPT

## 2022-03-30 PROCEDURE — 86900 BLOOD TYPING SEROLOGIC ABO: CPT

## 2022-03-30 PROCEDURE — 71045 X-RAY EXAM CHEST 1 VIEW: CPT

## 2022-03-30 PROCEDURE — 76000 FLUOROSCOPY <1 HR PHYS/QHP: CPT

## 2022-03-30 PROCEDURE — 86901 BLOOD TYPING SEROLOGIC RH(D): CPT

## 2022-03-30 PROCEDURE — 99232 SBSQ HOSP IP/OBS MODERATE 35: CPT | Mod: GC

## 2022-03-30 PROCEDURE — 80053 COMPREHEN METABOLIC PANEL: CPT

## 2022-03-30 PROCEDURE — 87635 SARS-COV-2 COVID-19 AMP PRB: CPT

## 2022-03-30 PROCEDURE — 84100 ASSAY OF PHOSPHORUS: CPT

## 2022-03-30 PROCEDURE — 36415 COLL VENOUS BLD VENIPUNCTURE: CPT

## 2022-03-30 PROCEDURE — C1889: CPT

## 2022-03-30 PROCEDURE — 83690 ASSAY OF LIPASE: CPT

## 2022-03-30 PROCEDURE — 83735 ASSAY OF MAGNESIUM: CPT

## 2022-03-30 PROCEDURE — 85610 PROTHROMBIN TIME: CPT

## 2022-03-30 PROCEDURE — 86850 RBC ANTIBODY SCREEN: CPT

## 2022-03-30 PROCEDURE — 76705 ECHO EXAM OF ABDOMEN: CPT

## 2022-03-30 PROCEDURE — 93005 ELECTROCARDIOGRAM TRACING: CPT

## 2022-03-30 PROCEDURE — 99285 EMERGENCY DEPT VISIT HI MDM: CPT | Mod: 25

## 2022-03-30 PROCEDURE — 85025 COMPLETE CBC W/AUTO DIFF WBC: CPT

## 2022-03-30 RX ORDER — OXYCODONE HYDROCHLORIDE 5 MG/1
1 TABLET ORAL
Qty: 4 | Refills: 0
Start: 2022-03-30

## 2022-03-30 RX ORDER — ASPIRIN/CALCIUM CARB/MAGNESIUM 324 MG
1 TABLET ORAL
Qty: 0 | Refills: 0 | DISCHARGE

## 2022-03-30 RX ADMIN — Medication 650 MILLIGRAM(S): at 13:09

## 2022-03-30 RX ADMIN — Medication 650 MILLIGRAM(S): at 12:19

## 2022-03-30 RX ADMIN — HEPARIN SODIUM 5000 UNIT(S): 5000 INJECTION INTRAVENOUS; SUBCUTANEOUS at 05:22

## 2022-03-30 NOTE — PROGRESS NOTE ADULT - SUBJECTIVE AND OBJECTIVE BOX
S/p laparoscopic cholecystectomy POD#1  Patient seen and examined at bedside  Denies nausea/ vomiting.     Vital Signs Last 24 Hrs  T(F): 97.8 (03-30-22 @ 05:44), Max: 98.1 (03-29-22 @ 14:02)  HR: 86 (03-30-22 @ 05:44)  BP: 135/66 (03-30-22 @ 05:44)  RR: 16 (03-30-22 @ 05:44)  SpO2: 100% (03-30-22 @ 05:44)    GENERAL: Alert, NAD  CHEST/LUNG: respirations nonlabored  ABDOMEN: Dressing over port sites c/d/i x4; soft, appropriate incisional tenderness, Nondistended  EXTREMITIES:  no calf tenderness, No edema    I&O's Detail    29 Mar 2022 07:01  -  30 Mar 2022 07:00  --------------------------------------------------------  IN:    Lactated Ringers: 150 mL    Lactated Ringers Bolus: 800 mL  Total IN: 950 mL    OUT:    Voided (mL): 300 mL  Total OUT: 300 mL    Total NET: 650 mL    LABS:                        12.1   12.37 )-----------( 266      ( 30 Mar 2022 06:31 )             36.3     03-30    136  |  104  |  7   ----------------------------<  129<H>  4.0   |  24  |  0.66    Ca    9.1      30 Mar 2022 06:31  Phos  3.2     03-29  Mg     1.8     03-29    TPro  6.6  /  Alb  3.4<L>  /  TBili  0.8  /  DBili  x   /  AST  27  /  ALT  27  /  AlkPhos  89  03-30    PT/INR - ( 28 Mar 2022 18:13 )   PT: 12.1 sec;   INR: 1.02 ratio    
Patient seen and examined at bedside.   She states that her pain is improved.  Denies nausea and vomiting.     Vital signs: WNL    PE:   GEN: alert, NAD  RESP: no respiratory distress, good inspiratory effort  ABD: Soft, +RUQ tenderness to palpation   EXT: no calf tenderness bilaterally    LABS: WNL    
PGY 1 Note discussed with supervising resident and primary attending    Patient is a 76y old  Female who presents with a chief complaint of Cholelithiasis (29 Mar 2022 06:33)      INTERVAL HPI/OVERNIGHT EVENTS:  Patient seen and examined at  side, was out of bed to chair, s/p Lap татьяна , POD1, tolerating clear liquid diet. Patient able to pass pieter.  MEDICATIONS  (STANDING):  dextrose 5% + sodium chloride 0.45%. 1000 milliLiter(s) (90 mL/Hr) IV Continuous <Continuous>  heparin   Injectable 5000 Unit(s) SubCutaneous every 8 hours  lactated ringers. 1000 milliLiter(s) (75 mL/Hr) IV Continuous <Continuous>    MEDICATIONS  (PRN):  acetaminophen     Tablet .. 650 milliGRAM(s) Oral every 6 hours PRN Temp greater or equal to 38C (100.4F), Mild Pain (1 - 3)  morphine  - Injectable 2 milliGRAM(s) IV Push every 6 hours PRN Severe Pain (7 - 10)  ondansetron Injectable 4 milliGRAM(s) IV Push every 6 hours PRN Nausea  oxycodone    5 mG/acetaminophen 325 mG 1 Tablet(s) Oral every 6 hours PRN Moderate Pain (4 - 6)      __________________________________________________  REVIEW OF SYSTEMS:    CONSTITUTIONAL: No fever,   EYES: no acute visual disturbances  NECK: No pain or stiffness  RESPIRATORY: No cough; No shortness of breath  CARDIOVASCULAR: No chest pain, no palpitations  GASTROINTESTINAL: No pain. No nausea or vomiting; No diarrhea   NEUROLOGICAL: No headache or numbness, no tremors  MUSCULOSKELETAL: No joint pain, no muscle pain  GENITOURINARY: no dysuria, no frequency, no hesitancy  PSYCHIATRY: no depression , no anxiety  ALL OTHER  ROS negative        Vital Signs Last 24 Hrs  T(C): 36.6 (30 Mar 2022 05:44), Max: 36.7 (29 Mar 2022 14:02)  T(F): 97.8 (30 Mar 2022 05:44), Max: 98.1 (29 Mar 2022 14:02)  HR: 86 (30 Mar 2022 05:44) (52 - 86)  BP: 135/66 (30 Mar 2022 05:44) (104/48 - 152/58)  BP(mean): 77 (29 Mar 2022 21:43) (62 - 78)  RR: 16 (30 Mar 2022 05:44) (11 - 20)  SpO2: 100% (30 Mar 2022 05:44) (96% - 100%)    ________________________________________________  PHYSICAL EXAM:  GENERAL: NAD  HEENT: Normocephalic;  conjunctivae and sclerae clear; moist mucous membranes;   NECK : supple  CHEST/LUNG: Clear to auscultation bilaterally with good air entry   HEART: S1 S2  regular; no murmurs, gallops or rubs  ABDOMEN: Soft, Nontender, Nondistended; Bowel sounds present,  EXTREMITIES: no cyanosis; no edema; no calf tenderness  SKIN: warm and dry; no rash  NERVOUS SYSTEM:  Awake and alert; Oriented  to place, person and time ; no new deficits    _________________________________________________  LABS:                        12.1   12.37 )-----------( 266      ( 30 Mar 2022 06:31 )             36.3     03-30    136  |  104  |  7   ----------------------------<  129<H>  4.0   |  24  |  0.66    Ca    9.1      30 Mar 2022 06:31  Phos  3.2     03-29  Mg     1.8     03-29    TPro  6.6  /  Alb  3.4<L>  /  TBili  0.8  /  DBili  x   /  AST  27  /  ALT  27  /  AlkPhos  89  03-30    PT/INR - ( 28 Mar 2022 18:13 )   PT: 12.1 sec;   INR: 1.02 ratio             CAPILLARY BLOOD GLUCOSE            RADIOLOGY & ADDITIONAL TESTS:    Imaging Personally Reviewed:  YES/NO    Consultant(s) Notes Reviewed:   YES/ No    Care Discussed with Consultants :     Plan of care was discussed with patient and /or primary care giver; all questions and concerns were addressed and care was aligned with patient's wishes.

## 2022-03-30 NOTE — PROGRESS NOTE ADULT - NS ATTEND AMEND GEN_ALL_CORE FT
Agree with above, I have seen and examined the patient  Condition discussed with patient, options risks and benefits explained to patient and .  Questions answered.  Laparoscopic cholecystectomy, possible open today.
Agree with above, I have seen and examined the patient  Home today

## 2022-03-30 NOTE — DISCHARGE NOTE PROVIDER - CARE PROVIDER_API CALL
Jian Luna)  Surgery  [address] [facility], [place] [address]  Phone: [phone]  Fax: [phone]  Follow Up Time: 2 weeks

## 2022-03-30 NOTE — DISCHARGE NOTE PROVIDER - NSDCCPCAREPLAN_GEN_ALL_CORE_FT
PRINCIPAL DISCHARGE DIAGNOSIS  Diagnosis: Symptomatic cholelithiasis  Assessment and Plan of Treatment: Please follow-up with your surgeon in 1 week. Drink plenty of fluids and rest as needed. Call for any fever over 101, nausea, vomiting, severe pain, no passing of gas or bowel movement.   DIET  You may resume your regular diet as normal.   SURGICAL SITES  Remove outer dressing and keep white steri-strips in place allowing them to fall off on their own. You may shower 48 hours post-operatively but do not bathe or soak in the water for 1-2 weeks; pat dry. If you notice any signs of surgical site infection (ie. redness, swelling, pain, pus drainage), please seek medical care immediately.   ACTIVITY  Do not lift anything heavier than 10 pounds for 2 weeks and avoid strenuous activity for 4-6 weeks.   PAIN CONTROL  You may take Motrin 600mg (with food) or Tylenol 650mg as needed for mild pain. Stagger one medication 3 hours after the other for maximum pain control. Maximum daily dose of Tylenol should not exceed 4grams/day.   You may take your prescribed oxycodone for severe breakthrough pain not that is not relieved by Tylenol/Motrin. Do not drive or make important decisions while taking this medication and do not take more than 4 pills in 24 hours.       PRINCIPAL DISCHARGE DIAGNOSIS  Diagnosis: Symptomatic cholelithiasis  Assessment and Plan of Treatment: Please follow-up with your surgeon in 1 week. Drink plenty of fluids and rest as needed. Call for any fever over 101, nausea, vomiting, severe pain, no passing of gas or bowel movement.   DIET  You may resume your regular diet as normal.   SURGICAL SITES  Remove outer dressing and keep white steri-strips in place allowing them to fall off on their own. You may shower 48 hours post-operatively but do not bathe or soak in the water for 1-2 weeks; pat dry. If you notice any signs of surgical site infection (ie. redness, swelling, pain, pus drainage), please seek medical care immediately.   ACTIVITY  Do not lift anything heavier than 10 pounds for 2 weeks and avoid strenuous activity for 4-6 weeks.   PAIN CONTROL  You may take Motrin 600mg (with food) or Tylenol 650mg as needed for mild pain. Stagger one medication 3 hours after the other for maximum pain control. Maximum daily dose of Tylenol should not exceed 4grams/day.   You may take your prescribed oxycodone for severe breakthrough pain not that is not relieved by Tylenol/Motrin. Do not drive or make important decisions while taking this medication and do not take more than 4 pills in 24 hours.      SECONDARY DISCHARGE DIAGNOSES  Diagnosis: Hypertension  Assessment and Plan of Treatment: continue home medication as previously directed. Follow up with PMD as needed

## 2022-03-30 NOTE — PROGRESS NOTE ADULT - ASSESSMENT
76 year old female with symptomatic cholelithiasis, PMH HTN    - laparoscopic cholecystectomy planning  - medical clearance/optimization appreciated  - consent to be obtained  - preop labs  - will discuss with Dr. Luna     
76 year old female s/p laparoscopic cholecystectomy POD#1    - advance diet as tolerated  - discharge planning when tolerating regular diet  - discuss with Dr. Luna 
76 yr old female hx of HTN, GIST, s/p partial gastrectomy and cholelithiasis presenting to the ED with 10 days of RUQ pain. Patient admitted for cholilithiasis requiring surgery.    -Cholelithiasis /p lap cholecystectomy  - Hypertension  - hx of GIST s/p resection      #Cholelithiasis    Patient presented with RUQ pain with nausea and vomiting. normal WBC afebrile.  US abdomen noted for Chololithiasis with out evidence of cholecystitis.  Patient s/p Cholecystectomy, S/p POD 1.  able to pass pieter.  Diet advanced to clear as per surgery.      #Hypertension:  Patient states she takes lisinopril at home.  Patient started on clears as per surgery.  can resume home dose of lisinopril.  monitor blood pressure.

## 2022-03-30 NOTE — DISCHARGE NOTE NURSING/CASE MANAGEMENT/SOCIAL WORK - NSDCPEFALRISK_GEN_ALL_CORE
For information on Fall & Injury Prevention, visit: [email or web address] OR  [email or web address] OR  https://www.cdc.gov/steadi/patient.html

## 2022-03-30 NOTE — DISCHARGE NOTE NURSING/CASE MANAGEMENT/SOCIAL WORK - PATIENT PORTAL LINK FT
You can access the FollowMyHealth Patient Portal offered by [facility] by registering at the following website: [email or web address]. By joining [employer]’s FollowMyHealth portal, you will also be able to view your health information using other applications (apps) compatible with our system.

## 2022-03-30 NOTE — DISCHARGE NOTE PROVIDER - NSDCMRMEDTOKEN_GEN_ALL_CORE_FT
Alivio 600 mg oral tablet: 1 tab(s) orally 4 times a day   aspirin 81 mg oral delayed release tablet: 1 tab(s) orally once a day last dose   lisinopril 10 mg oral tablet:  orally once a day (at bedtime)  meclizine 25 mg oral tablet: 1 tab(s) orally every 8 hours, As Needed -for dizziness   metoprolol tartrate 25 mg oral tablet: 1 tab(s) orally every 12 hours  oxyCODONE 5 mg oral tablet: 1 tab(s) orally every 6 hours, As Needed -for agitation - for severe pain MDD:4  Zofran ODT 4 mg oral tablet, disintegratin tab(s) orally every 8 hours

## 2022-03-30 NOTE — PROGRESS NOTE ADULT - ATTENDING COMMENTS
76 year old woman with PMH as above presenting with RUQ pain, admitted for cholelithiases. Patient is now s/p cholecystectomy, day 1.   #Cholecystectomy  #HTN  #Hx GIST, s/p partial gastrectomy  - Advance diet as tolerated - Tolerating regular diet as of now  - DC per primary team  - Continue home BP med  - Will sign off, can reconsult as needed

## 2022-03-30 NOTE — DISCHARGE NOTE PROVIDER - HOSPITAL COURSE
76 yr old female hx of HTN, GIST, s/p partial gastrectomy and cholelithiasis presenting to the ED with 10 days of RUQ pain. Pain is intermittent, severe and radiating to the back with associated nausea without vomiting over the last 10 days. Denies fever and chills . Pt was seen and examined in the office by Dr. Luna.  No other acute complaints at this time.   She was found to have acute cholecystitis and was taken to the OR on 3/29 for laparoscopic cholecystectomy.   Postoperatively she was cleared for discharge when she was tolerating a regular diet, ambulating, voiding, pain was well controlled and vitals were stable.

## 2022-04-06 LAB — SURGICAL PATHOLOGY STUDY: SIGNIFICANT CHANGE UP

## 2022-04-14 ENCOUNTER — APPOINTMENT (OUTPATIENT)
Dept: SURGERY | Facility: CLINIC | Age: 77
End: 2022-04-14
Payer: MEDICARE

## 2022-04-14 DIAGNOSIS — K81.9 CHOLECYSTITIS, UNSPECIFIED: ICD-10-CM

## 2022-04-14 PROCEDURE — 99024 POSTOP FOLLOW-UP VISIT: CPT

## 2022-04-14 NOTE — ASSESSMENT
[FreeTextEntry1] : Ms. LEOS is doing well, with excellent post-operative recovery. All surgical incisions are healing well and as expected. There is no evidence of infection or complication, and she is progressing as expected. Post-operative wound care, activity, restrictions and precautions reinforced. Patient instructed to refrain from any heavy lifting greater than 10-15 pounds for at least 4 weeks post-operatively. pathology results were discussed in details.  Patient's questions and concerns addressed to patient's satisfaction.

## 2022-04-14 NOTE — HISTORY OF PRESENT ILLNESS
[de-identified] : Ms. LEOS  is s/p laparoscopic  cholecystectomy  on 03/29/2022. Today Ms. LEOS offers no complaints. patient reports no fever, chills,  or  pain. Her  surgical wounds are  healing well. No signs of inflammation, infection or exudate. Patient reports good bowel movements and appetite.

## 2022-04-14 NOTE — DATA REVIEWED
[FreeTextEntry1] : \par \par \par   [facility] Accession Number : [id number]\par Patient:   ALISON LEOS\par \par \par Accession:                             [id number]\par \par Collected Date/Time:                   3/29/2022 18:30 EDT\par Received Date/Time:                    3/30/2022 08:00 EDT\par \par Surgical Pathology Report - Auth (Verified)\par \par Specimen(s) Submitted\par 1  Gallbladder\par \par Final Diagnosis\par Gallbladder, cholecystectomy: Chronic calculous cholecystitis\par Cholesterolosis and cholesterol polyposis\par Benign cystic duct lymph node\par \par Verified by: Mariola Clark M.D.\par (Electronic Signature)\par Reported on: 04/06/22 11:30 EDT, [facility], [id number]\par [address] [place] [address]\par _________________________________________________________________\par \par Clinical Information\par Cholelithiasis\par  \par

## 2022-04-14 NOTE — PLAN
[FreeTextEntry1] : Ms. LEOS will follow up  if needed. Warning signs, follow up, and restrictions were discussed with the patient.

## 2022-04-14 NOTE — PHYSICAL EXAM
[Calm] : calm [de-identified] : She  is alert, well-groomed, and in NAD\par   [de-identified] : Surgical wounds are  healing well.   no signs of  inflammation or infection.

## 2022-06-17 ENCOUNTER — APPOINTMENT (OUTPATIENT)
Dept: SURGERY | Facility: HOSPITAL | Age: 77
End: 2022-06-17

## 2022-09-22 NOTE — ED ADULT TRIAGE NOTE - STATUS:
Please make an appointment to follow up with [facility]rology [facility] (phone: [phone]).  They should contact you to schedule follow-up appointment as well as to schedule an outpatient EMG.    Please make an appointment to follow up with Neurosurgery Clinic (phone: [phone]).  They will reach out to you to set up an appointment.      Return to the emergency department for any problems.     [employer]

## 2022-12-09 ENCOUNTER — APPOINTMENT (OUTPATIENT)
Dept: GASTROENTEROLOGY | Facility: CLINIC | Age: 77
End: 2022-12-09

## 2022-12-09 VITALS
HEART RATE: 70 BPM | BODY MASS INDEX: 23.36 KG/M2 | DIASTOLIC BLOOD PRESSURE: 82 MMHG | SYSTOLIC BLOOD PRESSURE: 152 MMHG | HEIGHT: 60 IN | WEIGHT: 119 LBS | OXYGEN SATURATION: 99 %

## 2022-12-09 DIAGNOSIS — K59.00 CONSTIPATION, UNSPECIFIED: ICD-10-CM

## 2022-12-09 DIAGNOSIS — L29.0 PRURITUS ANI: ICD-10-CM

## 2022-12-09 DIAGNOSIS — K21.9 GASTRO-ESOPHAGEAL REFLUX DISEASE W/OUT ESOPHAGITIS: ICD-10-CM

## 2022-12-09 DIAGNOSIS — R10.12 LEFT UPPER QUADRANT PAIN: ICD-10-CM

## 2022-12-09 DIAGNOSIS — Z85.09 PERSONAL HISTORY OF MALIGNANT NEOPLASM OF OTHER DIGESTIVE ORGANS: ICD-10-CM

## 2022-12-09 PROCEDURE — 99214 OFFICE O/P EST MOD 30 MIN: CPT

## 2022-12-09 RX ORDER — DORZOLAMIDE HYDROCHLORIDE TIMOLOL MALEATE 20; 5 MG/ML; MG/ML
22.3-6.8 SOLUTION/ DROPS OPHTHALMIC
Qty: 10 | Refills: 0 | Status: ACTIVE | COMMUNITY
Start: 2022-12-06

## 2022-12-09 RX ORDER — LISINOPRIL 20 MG/1
20 TABLET ORAL
Qty: 90 | Refills: 0 | Status: ACTIVE | COMMUNITY
Start: 2022-07-13

## 2022-12-09 RX ORDER — SIMETHICONE 125 MG/1
125 TABLET, CHEWABLE ORAL
Qty: 120 | Refills: 3 | Status: ACTIVE | COMMUNITY
Start: 2022-12-09 | End: 1900-01-01

## 2022-12-09 RX ORDER — CLOTRIMAZOLE AND BETAMETHASONE DIPROPIONATE 10; .5 MG/G; MG/G
1-0.05 CREAM TOPICAL 3 TIMES DAILY
Qty: 1 | Refills: 3 | Status: ACTIVE | COMMUNITY
Start: 2022-12-09 | End: 1900-01-01

## 2022-12-09 RX ORDER — ATORVASTATIN CALCIUM 20 MG/1
20 TABLET, FILM COATED ORAL
Qty: 90 | Refills: 0 | Status: ACTIVE | COMMUNITY
Start: 2021-10-26

## 2022-12-09 RX ORDER — CHLORHEXIDINE GLUCONATE, 0.12% ORAL RINSE 1.2 MG/ML
0.12 SOLUTION DENTAL
Qty: 1419 | Refills: 0 | Status: ACTIVE | COMMUNITY
Start: 2022-11-17

## 2022-12-09 RX ORDER — LISINOPRIL 5 MG/1
5 TABLET ORAL
Qty: 90 | Refills: 0 | Status: ACTIVE | COMMUNITY
Start: 2022-07-13

## 2022-12-09 NOTE — ASSESSMENT
[FreeTextEntry1] : Abdominal Pain: The patient complains of abdominal pain. The patient is to avoid nonsteroidal anti-inflammatory drugs and aspirin. I recommend a trial of Pantoprazole 40 mg once a day for 3 months for the symptoms.  \par Dyspepsia: The patient complains of dyspeptic symptoms.  The patient was advised to continue to abide by an anti-gas (low FOD-MAP) diet.  The patient was previously given a pamphlet for anti-gas (low FOD-MAP).  The patient and I reviewed the anti-gas (low FOD-MAP)diet at length again. The patient is to continue on a trial of Simethicone one tablet 4 times a day p.r.n. abdominal pain and gas.\par GERD: The patient was advised to avoid late-night meals and dietary indiscretions.  The patient was advised to avoid fried and fatty foods.  The patient was advised to abide by an anti-GERD diet. The patient was given a pamphlet for anti-GERD.  The patient and I reviewed the anti-GERD diet at length. I recommend a trial of Pantoprazole 40 mg once a day x 3 months for the symptoms.\par Constipation: The patient complains of constipation. I recommend a high-fiber diet. I recommend a trial of a probiotic such as Align once a day. I recommend a trial of Metamucil once a day for fiber supplementation.  The patient agreed and will followup to reassess the symptoms.  \par Anal Pruritus: The patient had episodes of anal pruritus.  The etiology of the anal pruritus is unclear.  I recommend a trial of Anusol HC suppositories one per rectum QHS and Calmoseptine cream apply to affected area twice a day PRN hemorrhoidal bleeding or pain.  I recommend a trial of Calmoseptine cream apply to affected area twice a day for rectal discomfort. I recommend Tucks pads for the hemorrhoids.  I recommend starting Sitz baths twice a day for the hemorrhoids.  I recommend avoid wearing tight underwear and use boxers. I recommend avoid touching the perineal area.  The patient agrees and will follow up to assess the symptoms.\par Gallbladder Polyps: The patient has gallbladder polyps on prior abdominal ultrasound. The patient is s/p cholecystectomy with Dr. Jian Luna.  The surgical pathology performed on March 29, 2022 revealed chronic calculus cholecystitis, cholesterolosis and cholesterol polyposis and benign cystic duct with lymph node. The patient tolerated the surgery well. \par Cholelithiasis: The prior abdominal ultrasound performed revealed evidence of cholelithiasis and possible gallbladder polyps. The patient is s/p cholecystectomy with Dr. Jian Luna.  The surgical pathology performed on March 29, 2022 revealed chronic calculus cholecystitis, cholesterolosis and cholesterol polyposis and benign cystic duct with lymph node. The patient tolerated the surgery well. \par History of GIST: A prior upper endoscopy performed by Dr. Kami Ac revealed a gastric lesion in the fundus of the stomach (gastric polyp versus subepithelial lesion). The pathology performed on May 18, 2016 revealed a few detached and/or crush cells with immunoreactivity for , CD34 and DOG1 likely a gastrointestinal stromal tumor. Also noted was chronic mild active gastritis that was negative for Helicobacter pylori. The patient underwent a partial gastrectomy by Dr. Cristóbal Avila on June 7, 2016. 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 The resection margins were negative for tumor with a pathologic staging of T1NX. The patient tolerated the surgery well. Followup did not reveal recurrence of the GIST.\par Follow-up: The patient is to follow-up in the office in 3 months to reassess the symptoms. The patient was told to call the office if any further problems. \par

## 2022-12-09 NOTE — HISTORY OF PRESENT ILLNESS
[FreeTextEntry1] : The patient states that she is feeling better.  The patient is s/p cholecystectomy with Dr. Jian Luna.  The surgical pathology performed on March 29, 2022 revealed chronic calculus cholecystitis, cholesterolosis and cholesterol polyposis and benign cystic duct with lymph node. The patient tolerated the surgery well. The patient denies any jaundice or pruritus.  The patient complains of chronic lower back pain. The patient complains of abdominal pain.  The patient describes the abdominal pain as a crampy, intermittent midepigastric and left upper quadrant discomfort that occasionally radiates to the back.  The abdominal pain is unrelated to passing gas or having bowel movements.  The abdominal pain is worse with meals.  The abdominal pain is described as mild to moderate in nature.  The abdominal pain occurs at night and in the morning.  The abdominal pain can occur at any time.   The abdominal pain has never awakened the patient from sleep.  The abdominal pain is not relieved with medication.  The abdominal pain is associated with abdominal gas and bloating.  The patient complains of occasional nausea but denies any vomiting.  The patient complains of gastroesophageal reflux disease but denies any dysphagia.  The patient denies taking medications for the gastroesophageal reflux disease.  The gastroesophageal reflux disease is worse after meals and late at night and in the early morning. The patient denies any atypical chest pain, shortness of breath or palpitations.  The patient admits to occasional episodes of diaphoresis.  The patient complains of constipation but denies any diarrhea.  The patient has 1 bowel movement every 1 to 2 days. The diarrhea is described as soft to watery in nature.   The patient complains of a change in bowel habits.  The patient complains of a change in caliber of stool. The patient denies having mucus discharge with the bowel movements.  The patient denies any bright red blood per rectum, melena or hematemesis.  The patient complains of rectal pain and rectal pruritus.  The patient denies any weight loss or anorexia.  She denies any fevers or chills.  The abdominal ultrasound performed on December 1, 2021 revealed cholelithiasis with subcentimeter gallbladder polyps with no findings of acute cholecystitis. The CT colonography performed on March 18, 2021 revealed a moderately tortuous colon with mild scattered diverticulosis with no persistent wall thickening, mass or discrete mucosal lesion identified. Also noted were cholelithiasis. The endoscopic ultrasound performed by Dr. Ralph Faustin revealed a small sliding hiatal hernia, a normal esophagus, moderate inflammation found in the gastric antrum. The endoscopic ultrasound performed by Dr. Ralph Faustin on December 13, 2021 revealed pancreatic parenchymal abnormalities consisting of diffuse echogenicity and lobularity were noted in the entire pancreas. These are nonspecific findings but could represent a resolving pancreatitis, fatty infiltration or chronic pancreatitis. There is no sign of significant pathology in the common bile duct, common hepatic duct and intrahepatic bile ducts. Multiple stones and polyps were visualized endosonographically in the gallbladder.The patient was previously evaluated at [facility] in 2016 for history of colitis. A prior upper endoscopy performed by Dr. Kami Ac revealed a gastric lesion in the fundus of the stomach (gastric polyp versus subepithelial lesion). The pathology performed on May 18, 2016 revealed a few detached and/or crush cells with immunoreactivity for , CD34 and DOG1 likely a gastrointestinal stromal tumor. Also noted was chronic mild active gastritis that was negative for Helicobacter pylori. The patient underwent a partial gastrectomy by Dr. Cristóbal Avila on June 7, 2016. 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 The resection margins were negative for tumor with a pathologic staging of T1NX. The patient tolerated the surgery well. 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 The patient had an upper endoscopy performed on March 15, 2021 by Dr. Russ Esquivel. The upper endoscopy performed on March 15, 2021 revealed mild distal mucosal erythema in the distal esophagus, a hiatal hernia and patchy mucosal erythema in the antrum of the stomach. The pathology revealed fragments of unremarkable squamous esophageal epithelium with a PASF stain that is negative for fungal microorganisms and chronic inactive gastritis that was negative for Helicobacter pylori. The blood work performed on February 3, 2021 revealed no evidence of anemia with a hemoglobin/hematocrit level of 13.4/41.7, respectively, normal liver enzymes with a total bilirubin of 0.5 mg/dL, a normal alkaline phosphatase/AST/ALT of 97/19/16 U/L, respectively, the blood work performed on January 30, 2020 revealed normal liver enzymes with a total bilirubin of 0.6 mg/dL, a normal alkaline phosphatase/AST/ALT of 66/14/13 U/L, respectively, no evidence of anemia with a hemoglobin/hematocrit level of 12.7/41.9, respectively, and elevated WBC count of 12.33 K/ul, and elevated TSH of 5.99 uIU/ml, a normal vitamin D level of 35.1 ng/mL, a normal C-reactive protein of < 10 mg/dl, and a normal ESR of 19 mm/h. The prior CAT scan of the abdomen pelvis with IV contrast performed on April 8, 2018 revealed no evidence of disease recurrence. The patient admits to having a prior upper endoscopy and colonoscopy performed by another gastroenterologist. The upper endoscopic findings revealed gastritis. The colonoscopic findings revealed a long and tortuous colon and internal hemorrhoids. The patient admits to a family history of GI problems. The patient’s sister had a history of gallbladder disease.\par \par (-) smoking, (-) ETOH, (-) IVDA\par  [de-identified] : The endoscopic ultrasound performed by Dr. Ralph Faustin on December 13, 2021 revealed pancreatic parenchymal abnormalities consisting of diffuse echogenicity and lobularity were noted in the entire pancreas. These are nonspecific findings but could represent a resolving pancreatitis, fatty infiltration or chronic pancreatitis. There is no sign of significant pathology in the common bile duct, common hepatic duct and intrahepatic bile ducts. Multiple stones and polyps were visualized endosonographically in the gallbladder.  \par  [de-identified] : The CT colonography performed on March 18, 2021 revealed a moderately tortuous colon with mild scattered diverticulosis with no persistent wall thickening, mass or discrete mucosal lesion identified. Also noted were cholelithiasis. \par The CAT scan of the abdomen and pelvis with IV contrast performed on April 8, 2018 revealed no evidence of disease recurrence. \par  [de-identified] : The abdominal ultrasound performed on December 1, 2021 revealed cholelithiasis with subcentimeter gallbladder polyps with no findings of acute cholecystitis. \par \par

## 2022-12-12 ENCOUNTER — EMERGENCY (EMERGENCY)
Facility: HOSPITAL | Age: 77
LOS: 1 days | Discharge: HOME | End: 2022-12-12
Attending: STUDENT IN AN ORGANIZED HEALTH CARE EDUCATION/TRAINING PROGRAM
Payer: COMMERCIAL

## 2022-12-12 VITALS
OXYGEN SATURATION: 98 % | SYSTOLIC BLOOD PRESSURE: 143 MMHG | RESPIRATION RATE: 18 BRPM | DIASTOLIC BLOOD PRESSURE: 66 MMHG | HEART RATE: 68 BPM

## 2022-12-12 VITALS
WEIGHT: 111.99 LBS | OXYGEN SATURATION: 99 % | RESPIRATION RATE: 16 BRPM | SYSTOLIC BLOOD PRESSURE: 149 MMHG | TEMPERATURE: 98 F | HEART RATE: 60 BPM | DIASTOLIC BLOOD PRESSURE: 66 MMHG | HEIGHT: 61 IN

## 2022-12-12 DIAGNOSIS — Z90.3 ACQUIRED ABSENCE OF STOMACH [PART OF]: Chronic | ICD-10-CM

## 2022-12-12 DIAGNOSIS — Z98.89 OTHER SPECIFIED POSTPROCEDURAL STATES: Chronic | ICD-10-CM

## 2022-12-12 LAB
ALBUMIN SERPL ELPH-MCNC: 4.7 G/DL — SIGNIFICANT CHANGE UP (ref 3.3–5)
ALP SERPL-CCNC: 104 U/L — SIGNIFICANT CHANGE UP (ref 40–120)
ALT FLD-CCNC: 16 U/L — SIGNIFICANT CHANGE UP (ref 10–45)
ANION GAP SERPL CALC-SCNC: 10 MMOL/L — SIGNIFICANT CHANGE UP (ref 5–17)
APTT BLD: 33.1 SEC — SIGNIFICANT CHANGE UP (ref 27.5–35.5)
AST SERPL-CCNC: 18 U/L — SIGNIFICANT CHANGE UP (ref 10–40)
BASOPHILS # BLD AUTO: 0.05 K/UL — SIGNIFICANT CHANGE UP (ref 0–0.2)
BASOPHILS NFR BLD AUTO: 0.5 % — SIGNIFICANT CHANGE UP (ref 0–2)
BILIRUB SERPL-MCNC: 0.8 MG/DL — SIGNIFICANT CHANGE UP (ref 0.2–1.2)
BUN SERPL-MCNC: 10 MG/DL — SIGNIFICANT CHANGE UP (ref 7–23)
CALCIUM SERPL-MCNC: 9.9 MG/DL — SIGNIFICANT CHANGE UP (ref 8.4–10.5)
CHLORIDE SERPL-SCNC: 105 MMOL/L — SIGNIFICANT CHANGE UP (ref 96–108)
CO2 SERPL-SCNC: 26 MMOL/L — SIGNIFICANT CHANGE UP (ref 22–31)
CREAT SERPL-MCNC: 0.68 MG/DL — SIGNIFICANT CHANGE UP (ref 0.5–1.3)
EGFR: 89 ML/MIN/1.73M2 — SIGNIFICANT CHANGE UP
EOSINOPHIL # BLD AUTO: 0.22 K/UL — SIGNIFICANT CHANGE UP (ref 0–0.5)
EOSINOPHIL NFR BLD AUTO: 2.4 % — SIGNIFICANT CHANGE UP (ref 0–6)
GLUCOSE SERPL-MCNC: 97 MG/DL — SIGNIFICANT CHANGE UP (ref 70–99)
HCT VFR BLD CALC: 39.1 % — SIGNIFICANT CHANGE UP (ref 34.5–45)
HGB BLD-MCNC: 12.3 G/DL — SIGNIFICANT CHANGE UP (ref 11.5–15.5)
IMM GRANULOCYTES NFR BLD AUTO: 0.3 % — SIGNIFICANT CHANGE UP (ref 0–0.9)
INR BLD: 0.98 RATIO — SIGNIFICANT CHANGE UP (ref 0.88–1.16)
LYMPHOCYTES # BLD AUTO: 3.4 K/UL — HIGH (ref 1–3.3)
LYMPHOCYTES # BLD AUTO: 36.7 % — SIGNIFICANT CHANGE UP (ref 13–44)
MCHC RBC-ENTMCNC: 26.5 PG — LOW (ref 27–34)
MCHC RBC-ENTMCNC: 31.5 GM/DL — LOW (ref 32–36)
MCV RBC AUTO: 84.3 FL — SIGNIFICANT CHANGE UP (ref 80–100)
MONOCYTES # BLD AUTO: 0.47 K/UL — SIGNIFICANT CHANGE UP (ref 0–0.9)
MONOCYTES NFR BLD AUTO: 5.1 % — SIGNIFICANT CHANGE UP (ref 2–14)
NEUTROPHILS # BLD AUTO: 5.09 K/UL — SIGNIFICANT CHANGE UP (ref 1.8–7.4)
NEUTROPHILS NFR BLD AUTO: 55 % — SIGNIFICANT CHANGE UP (ref 43–77)
NRBC # BLD: 0 /100 WBCS — SIGNIFICANT CHANGE UP (ref 0–0)
PLATELET # BLD AUTO: 274 K/UL — SIGNIFICANT CHANGE UP (ref 150–400)
POTASSIUM SERPL-MCNC: 4.7 MMOL/L — SIGNIFICANT CHANGE UP (ref 3.5–5.3)
POTASSIUM SERPL-SCNC: 4.7 MMOL/L — SIGNIFICANT CHANGE UP (ref 3.5–5.3)
PROT SERPL-MCNC: 7.5 G/DL — SIGNIFICANT CHANGE UP (ref 6–8.3)
PROTHROM AB SERPL-ACNC: 11.3 SEC — SIGNIFICANT CHANGE UP (ref 10.5–13.4)
RBC # BLD: 4.64 M/UL — SIGNIFICANT CHANGE UP (ref 3.8–5.2)
RBC # FLD: 14.5 % — SIGNIFICANT CHANGE UP (ref 10.3–14.5)
SODIUM SERPL-SCNC: 141 MMOL/L — SIGNIFICANT CHANGE UP (ref 135–145)
TROPONIN T, HIGH SENSITIVITY RESULT: 7 NG/L — SIGNIFICANT CHANGE UP (ref 0–51)
WBC # BLD: 9.26 K/UL — SIGNIFICANT CHANGE UP (ref 3.8–10.5)
WBC # FLD AUTO: 9.26 K/UL — SIGNIFICANT CHANGE UP (ref 3.8–10.5)

## 2022-12-12 PROCEDURE — 99285 EMERGENCY DEPT VISIT HI MDM: CPT | Mod: 25

## 2022-12-12 PROCEDURE — 93010 ELECTROCARDIOGRAM REPORT: CPT

## 2022-12-12 PROCEDURE — 70450 CT HEAD/BRAIN W/O DYE: CPT | Mod: MA

## 2022-12-12 PROCEDURE — 84484 ASSAY OF TROPONIN QUANT: CPT

## 2022-12-12 PROCEDURE — 85025 COMPLETE CBC W/AUTO DIFF WBC: CPT

## 2022-12-12 PROCEDURE — 70450 CT HEAD/BRAIN W/O DYE: CPT | Mod: 26,MA

## 2022-12-12 PROCEDURE — 99285 EMERGENCY DEPT VISIT HI MDM: CPT

## 2022-12-12 PROCEDURE — 80053 COMPREHEN METABOLIC PANEL: CPT

## 2022-12-12 PROCEDURE — 85730 THROMBOPLASTIN TIME PARTIAL: CPT

## 2022-12-12 PROCEDURE — 85610 PROTHROMBIN TIME: CPT

## 2022-12-12 PROCEDURE — 93005 ELECTROCARDIOGRAM TRACING: CPT

## 2022-12-12 NOTE — ED ADULT NURSE REASSESSMENT NOTE - NS ED NURSE REASSESS COMMENT FT1
Patient alert and oriented, Slovenian speaking, ambulating to restroom with steady gait noted. Family at bedside, awaiting CT results

## 2022-12-12 NOTE — ED PROVIDER NOTE - OBJECTIVE STATEMENT
78 yo F PMHx HTN on lisinopril, takes asa81 daily, never smoker, presents to the ED for left sided numbness to the lower face, arm, and legs x5 days. It is intermittent, sometimes comes on when she is walking, and then self resolves. States sometimes also has some rle numbness. Pt denies weakness, facial droop, slurred speech, HA, blurred vision, diplopia, neck pain, fevers, abd pain, cp, sob, palpitations, dysuria, hematuria, back pain, urinary/bowel incontinence, dizziness, unsteady gait.

## 2022-12-12 NOTE — ED PROVIDER NOTE - PATIENT PORTAL LINK FT
You can access the FollowMyHealth Patient Portal offered by [facility] by registering at the following website: [email or web address]. By joining [employer]’s FollowMyHealth portal, you will also be able to view your health information using other applications (apps) compatible with our system.

## 2022-12-12 NOTE — ED PROVIDER NOTE - PHYSICAL EXAMINATION
PHYSICAL EXAM:  GENERAL: non-toxic appearing; in no respiratory distress  HEAD Atraumatic, Normocephalic  NECK: No JVD; trachea midline  EYES: PERRL, EOMs intact b/l w/out deficits; normal conjunctiva  CHEST/LUNG: CTAB no wheezes/rhonchi/rales  HEART: RRR no murmur/gallops/rubs  ABDOMEN: soft, NT, ND  EXTREMITIES: No LE edema, +2 radial pulses b/l, +2 DP/PT pulses b/l  MUSCULOSKELETAL: FROM of all 4 extremities;   NERVOUS SYSTEM:  A&Ox3, No motor deficits or sensory deficits; CNII-XII intact; Speech is fluent and appropriate; no drift; ambulates w/ steady gait; no dysmetria; no dysdiadochokinesia;   SKIN:  Warm and dry as visualized

## 2022-12-12 NOTE — ED PROVIDER NOTE - RAPID ASSESSMENT
5 days of L sided arm leg face numbness. No trauma. No vision changes. No speech changes. Able to walk but unsteady.

## 2022-12-12 NOTE — ED PROVIDER NOTE - ATTENDING APP SHARED VISIT CONTRIBUTION OF CARE
I, Russ Mueller, performed a history and physical exam of the patient and discussed their management with the resident and/or advanced care provider. I reviewed the resident and/or advanced care provider's note and agree with the documented findings and plan of care except where noted. I was present and available for all procedures.     I wrote h&P and mdm see above

## 2022-12-12 NOTE — ED PROVIDER NOTE - NS ED ROS FT
Constitutional: no fevers; no chills  HEENT: no visual changes, no sore throat, no rhinorrhea  CV: no cp; no palpitations  Resp: no sob; no cough  GI: no abd pain, no nausea, no vomiting, no diarrhea, no constipation  : no dysuria, no hematuria  MSK: no myalgias; no arthralgias  skin: no rashes  neuro: no HA, numbness; no weakness, no tingling  endo: no polyuria, no polydipsia

## 2022-12-12 NOTE — ED PROVIDER NOTE - CLINICAL SUMMARY MEDICAL DECISION MAKING FREE TEXT BOX
Russ Mueller MD. pt presenting with FIVE days of LUE, LLE, and L lower face numbness x5 days without other associated sx. pt without any neurologic deficits now. ambulates with steady gait here. possible TIAs here. lower suspicion for LVO as pt is asymptomatic now and has no deficits now. possible lyte abnormalities such as hypoK or hypoCa. will obtain CTH, will reassess. if CTH negative for acute pathology, likely dc w/ outpt neuro f/u

## 2022-12-12 NOTE — ED ADULT NURSE NOTE - OBJECTIVE STATEMENT
patient received via wheelchair accompanied by family complaining of left side numbness, x 5 days. Denies headache, dizziness, change in vision or fever. (+) steady gait. On examination patient denies pain or numbness to either side of body. patient received via wheelchair accompanied by family complaining of left side numbness, x 5 days. Denies headache, dizziness, change in vision or fever. (+) steady gait. On examination patient denies pain or numbness to either side of body. No neurodeficits noted. GCS-15 First encounter with the pt, pt received from triage with complaints of left side weakness for five days . Pt is a/ox4 and verbal. Speech is clear and able to speak in full sentences without distress. Airway is patent with no obstruction nor blocking secretions. Breathing is even and unlabored on room air. Pt has strong pulses palpated bilaterally. Pt is SR on the cardiac monitor. Neuro check is wnl. Full rom. Pt denies chest pain, n/v and sob. No acute distress noted. Pt has call light within the reach of the pt at the bedside. Will continue to monitor the pt.

## 2022-12-12 NOTE — ED PROVIDER NOTE - NSTIMEPROVIDERCAREINITIATE_GEN_ER
PHYSICIAN NEXT STEPS:   Review Only      CHIEF COMPLAINT:   Chief Complaint/Protocol Used: No Contact or Duplicate Contact Call   Onset: N/A         ASSESSMENT:   -------------------------------------------------------      DISPOSITION:   Disposition Recommendation: Call PCP when Office is Open   Questions that led to disposition:   â€¢ Requesting regular office appointment   Patient Directed To: Unspecified   Patient Intended Action: Go to Urgent Care Center         CALL NOTES:   09/29/2017 at 8:43 AM by Chela Weir   â€¢ Son called and he is taking his Mom to [facility] right now, by family car.  She is not with him at this time.   â€¢ Patient is not with the caller.      DISPOSITION OVERRIDE/PROVIDER CONSULT:   Disposition Override: Go to ED Now (or PCP triage)   Override Source: Nurse clinical judgment   Consulted with PCP: No   Consulted with On-Call Physician: No         CALLER CONTACT INFO:   Name: LEONILA MAI (Self)   Phone 1: [phone] (Home)   Phone 2: [phone] (Work)   Phone 3: [phone] - Preferred         ENCOUNTER STARTED:   09/29/17 08:30:38 AM   ENCOUNTER ASSIGNED TO/CLOSED BY:   Chela Weir @ 09/29/17 08:43:36 AM         -------------------------------------------------------      UNDERSTANDS CARE ADVICE: Yes      AGREES WITH CARE ADVICE: Yes      WILL FOLLOW CARE ADVICE: Yes      -------------------------------------------------------  
She went to [facility] emergency room on 9/29/2017 complaining of nausea vomiting and diarrhea.  She had a several day history of nausea vomiting and diarrhea.  She initially went to urgent care center and was sent home on an antiemetic.  Her symptoms did not improve.  There was no blood in the stool, no abdominal pain, no fever, no lightheadedness, no shortness of breath or chest pain or palpitations no headache.  She was admitted to [facility] and given IV fluids.  She was empirically treated with Flagyl for suspected C. difficile colitis on admission.  She tested negative for C. difficile toxin.  Stool cultures for enteric pathogens was also negative cardiology was consulted for new onset atrial fibrillation with a controlled ventricular response.  Echocardiogram revealed reduced systolic function with an ejection fraction of 35-40%.  There was mild global hypokinesis.  The study was not able to adequately assess left ventricular diastolic function.  There was mild aortic regurgitation.  She needs outpatient follow-up in 1 month for a cardioversion.  He was discharged home on 10/2/2017                Electronically signed by:BRUCE BERNHEIM M.D.  Oct  3 2017  1:14AM CST    
12-Dec-2022 13:32

## 2022-12-12 NOTE — ED PROVIDER NOTE - PROGRESS NOTE DETAILS
Russ Mueller MD. spoke w/ radiology resident re: CTH read. was told that the attending radiologist drafted a final report however but did not put it through and thus, there can only be a preliminary read for the CT and the final report will be provided in the AM. prelim read is negative for acute pathology. will dc patient with neuro f/u. ED evaluation and management discussed with the patient. Close PMD follow up encouraged.  Strict ED return instructions discussed in detail and patient given the opportunity to ask any questions about their discharge diagnosis and instructions. Patient verbalized understanding.

## 2022-12-12 NOTE — ED PROVIDER NOTE - NSFOLLOWUPINSTRUCTIONS_ED_ALL_ED_FT
You were evaluated in the emergency department for left sided numbness. Today, your CAT scan was normal however, it is still possible that you may be having mini-strokes (also known as transient ischemic attacks).     Neurology; Vascular Neurology  [address] [facility] [address], [address]  [place] [address]  Phone: [phone]  Fax: [phone]    For this, please continue taking your medications as prescribed, including Aspirin 81 milligrams daily.    Please follow up with your primary care physician and a neurologist within 1 week. Please call to make an appointment.   Please bring a copy of your results with you.  Please return to the emergency department for worsening of your symptoms.

## 2023-01-03 NOTE — ED ADULT NURSE NOTE - BREATHING, MLM
Hide Additional Anticipated Plan?: No Anesthesia Volume In Cc (Will Not Render If 0): 0.5 Detail Level: Detailed Notification Instructions: Patient will be notified of biopsy results. However, patient instructed to call the office if not contacted within 2 weeks. Electrodesiccation And Curettage Text: The wound bed was treated with electrodesiccation and curettage after the biopsy was performed. Wound Care: Petrolatum Biopsy Type: H and E Spontaneous, unlabored and symmetrical Size Of Lesion In Cm: 2.6 X Size Of Lesion In Cm: 2 Cryotherapy Text: The wound bed was treated with cryotherapy after the biopsy was performed. Depth Of Biopsy: dermis Billing Type: Third-Party Bill Biopsy Method: Personna blade Render Post-Care Instructions In Note?: yes Electrodesiccation Text: The wound bed was treated with electrodesiccation after the biopsy was performed. Dressing: Band-Aid Curettage Text: The wound bed was treated with curettage after the biopsy was performed. Post-Care Instructions: I reviewed with the patient in detail post-care instructions. Patient is to keep the biopsy site dry overnight, and then apply Vaseline twice daily until healed. Patient may apply hydrogen peroxide soaks to remove any crusting. Hemostasis: Aluminum Chloride and Electrocautery Additional Anesthesia Volume In Cc (Will Not Render If 0): 0 Consent: Verbal consent was obtained and risks were reviewed including but not limited to scarring, infection, bleeding, scabbing, incomplete removal, nerve damage and allergy to anesthesia. Information: Selecting Yes will display possible errors in your note based on the variables you have selected. This validation is only offered as a suggestion for you. PLEASE NOTE THAT THE VALIDATION TEXT WILL BE REMOVED WHEN YOU FINALIZE YOUR NOTE. IF YOU WANT TO FAX A PRELIMINARY NOTE YOU WILL NEED TO TOGGLE THIS TO 'NO' IF YOU DO NOT WANT IT IN YOUR FAXED NOTE. Silver Nitrate Text: The wound bed was treated with silver nitrate after the biopsy was performed. Type Of Destruction Used: Curettage Anesthesia Type: 1% lidocaine with epinephrine

## 2023-01-24 ENCOUNTER — APPOINTMENT (OUTPATIENT)
Dept: ORTHOPEDIC SURGERY | Facility: CLINIC | Age: 78
End: 2023-01-24
Payer: MEDICARE

## 2023-01-24 PROCEDURE — 99214 OFFICE O/P EST MOD 30 MIN: CPT | Mod: 25

## 2023-02-02 ENCOUNTER — APPOINTMENT (OUTPATIENT)
Dept: NEUROLOGY | Facility: CLINIC | Age: 78
End: 2023-02-02
Payer: MEDICARE

## 2023-02-02 PROCEDURE — 95885 MUSC TST DONE W/NERV TST LIM: CPT

## 2023-02-02 PROCEDURE — 95910 NRV CNDJ TEST 7-8 STUDIES: CPT

## 2023-02-14 ENCOUNTER — APPOINTMENT (OUTPATIENT)
Dept: ORTHOPEDIC SURGERY | Facility: CLINIC | Age: 78
End: 2023-02-14
Payer: MEDICARE

## 2023-02-14 PROCEDURE — 20526 THER INJECTION CARP TUNNEL: CPT | Mod: 50

## 2023-02-14 PROCEDURE — 99214 OFFICE O/P EST MOD 30 MIN: CPT | Mod: 25

## 2023-03-08 ENCOUNTER — APPOINTMENT (OUTPATIENT)
Dept: GASTROENTEROLOGY | Facility: CLINIC | Age: 78
End: 2023-03-08

## 2023-03-14 NOTE — ED ADULT NURSE NOTE - CAS EDN DISCHARGE INTERVENTIONS
1700:  pt received from ICU    1900: End of Shift Note    Bedside shift change report given to Seema Shields RN (oncoming nurse) by Hailee Dhillon [address] [facility] Flo Mayer Arm band on/IV intact

## 2023-04-16 ENCOUNTER — RX RENEWAL (OUTPATIENT)
Age: 78
End: 2023-04-16

## 2023-05-10 ENCOUNTER — RESULT REVIEW (OUTPATIENT)
Age: 78
End: 2023-05-10

## 2023-08-28 ENCOUNTER — APPOINTMENT (OUTPATIENT)
Dept: UROLOGY | Facility: CLINIC | Age: 78
End: 2023-08-28

## 2023-09-06 ENCOUNTER — NON-APPOINTMENT (OUTPATIENT)
Age: 78
End: 2023-09-06

## 2023-09-28 NOTE — ED ADULT NURSE NOTE - INTEGUMENTARY WDL
SCRIBE #1 NOTE: I, Leni Lopez, am scribing for, and in the presence of, Kenneth Glass MD. I have scribed the HPI, ROS, and PEx.     SCRIBE #2 NOTE: I, Afshin Daly, am scribing for, and in the presence of,  Peter Melgoza Jr., MD. I have scribed the remaining portions of the note not scribed by Scribe #1.      History     Chief Complaint   Patient presents with    Respiratory Distress    Shortness of Breath     Pt c/o difficulty breathing and chest tightness, productive cough and congestion since yesterday. States that she tested neg for covid      Review of patient's allergies indicates:   Allergen Reactions    Demerol (pf) [meperidine (pf)] Hives    Medrol [methylprednisolone] Anaphylaxis     Any cortisone    Adhesive      Other reaction(s): Unknown    Amoxicillin-pot clavulanate      Other reaction(s): Unknown    Atarax [hydroxyzine hcl] Hives    Hydralazine analogues Hives    Iodine      Other reaction(s): Unknown    Omnicef [cefdinir]     Penicillins Hives and Nausea And Vomiting    Phenergan [promethazine] Hives and Nausea And Vomiting    Risperidone analogues Hives    Ativan [lorazepam] Hives and Anxiety         History of Present Illness     HPI    9/28/2023, 5:35 AM  History obtained from the patient      History of Present Illness: Priti Obrien is a 37 y.o. female patient with a PMHx of asthma, HTN, substance abuse, and psychiatric care who presents to the Emergency Department for evaluation of SOB which onset yesterday. The pt reports 3 negative home COVID tests. Symptoms are constant and moderate in severity. No mitigating or exacerbating factors reported. Associated sxs include sore throat, productive cough, chest tightness, congestion, and vomiting. Patient denies any rhinorrhea, fever, CP, abdominal pain, weakness, and all other sxs at this time. No prior Tx reported. No further complaints or concerns at this time.       Arrival mode: Personal vehicle    PCP: Sherri Kovacs FNP        Past  Medical History:  Past Medical History:   Diagnosis Date    Allergy     Asthma     Childhood only    Bipolar 1 disorder     Borderline personality disorder     Depression     GERD (gastroesophageal reflux disease)     History of psychiatric hospitalization     Hx of psychiatric care     Hypertension     pt denied    Pancreatitis     Psychiatric problem     PTSD (post-traumatic stress disorder)     S/P partial hysterectomy 2011    Substance abuse     Suicide attempt        Past Surgical History:  Past Surgical History:   Procedure Laterality Date    APPENDECTOMY       SECTION      CHOLECYSTECTOMY      HYSTERECTOMY  2010    endometrial hyperplasia    LASER LAPAROSCOPY           Family History:  Family History   Problem Relation Age of Onset    Diabetes Mother     Hypertension Mother     Atrial fibrillation Maternal Grandfather     Breast cancer Maternal Aunt        Social History:  Social History     Tobacco Use    Smoking status: Former     Current packs/day: 0.00     Types: Cigarettes     Quit date: 2013     Years since quitting: 10.3    Smokeless tobacco: Never   Substance and Sexual Activity    Alcohol use: Yes     Comment: occasional     Drug use: Not Currently    Sexual activity: Yes     Partners: Male, Female        Review of Systems     Review of Systems   Constitutional:  Negative for fever.   HENT:  Positive for congestion and sore throat. Negative for rhinorrhea.    Respiratory:  Positive for cough (productive), chest tightness and shortness of breath.    Cardiovascular:  Negative for chest pain.   Gastrointestinal:  Positive for vomiting. Negative for abdominal pain.   Genitourinary:  Negative for dysuria.   Skin:  Negative for rash.   Neurological:  Negative for weakness.   Hematological:  Does not bruise/bleed easily.   All other systems reviewed and are negative.     Physical Exam     Initial Vitals [23 0518]   BP Pulse Resp Temp SpO2   (!) 167/109 96 20 98.2 °F (36.8 °C) (!)  "92 %      MAP       --          Physical Exam  Nursing Notes and Vital Signs Reviewed.  Constitutional: Patient is in no acute distress. Well-developed and well-nourished.  Head: Atraumatic. Normocephalic.  Eyes: PERRL. EOM intact. Conjunctivae are not pale. No scleral icterus.  ENT: Mucous membranes are moist. Oropharynx is clear and symmetric.    Neck: Supple. Full ROM. No lymphadenopathy.  Cardiovascular: Regular rate. Regular rhythm. No murmurs, rubs, or gallops. Distal pulses are 2+ and symmetric.  Pulmonary/Chest: No respiratory distress. Clear to auscultation bilaterally. No wheezing or rales. Actively coughing.   Abdominal: Soft and non-distended.  There is no tenderness.  No rebound, guarding, or rigidity. Good bowel sounds.  Genitourinary: No CVA tenderness  Musculoskeletal: Moves all extremities. No obvious deformities. No edema. No calf tenderness.  Skin: Warm and dry.  Neurological:  Alert, awake, and appropriate.  Normal speech.  No acute focal neurological deficits are appreciated.  Psychiatric: Normal affect. Good eye contact. Appropriate in content.     ED Course   Procedures  ED Vital Signs:  Vitals:    09/28/23 0518 09/28/23 0607   BP: (!) 167/109    Pulse: 96 77   Resp: 20 20   Temp: 98.2 °F (36.8 °C)    TempSrc: Oral    SpO2: (!) 92% 100%   Weight: 94.4 kg (208 lb 1.8 oz)    Height: 5' 2" (1.575 m)        Abnormal Lab Results:  Labs Reviewed   CBC W/ AUTO DIFFERENTIAL - Abnormal; Notable for the following components:       Result Value    Immature Granulocytes 0.6 (*)     Immature Grans (Abs) 0.06 (*)     All other components within normal limits   COMPREHENSIVE METABOLIC PANEL - Abnormal; Notable for the following components:    CO2 19 (*)     Glucose 213 (*)     Alkaline Phosphatase 51 (*)     All other components within normal limits   B-TYPE NATRIURETIC PEPTIDE   TROPONIN I        All Lab Results:  Results for orders placed or performed during the hospital encounter of 09/28/23   CBC auto " differential   Result Value Ref Range    WBC 10.79 3.90 - 12.70 K/uL    RBC 4.78 4.00 - 5.40 M/uL    Hemoglobin 14.0 12.0 - 16.0 g/dL    Hematocrit 39.2 37.0 - 48.5 %    MCV 82 82 - 98 fL    MCH 29.3 27.0 - 31.0 pg    MCHC 35.7 32.0 - 36.0 g/dL    RDW 12.2 11.5 - 14.5 %    Platelets 271 150 - 450 K/uL    MPV 9.7 9.2 - 12.9 fL    Immature Granulocytes 0.6 (H) 0.0 - 0.5 %    Gran # (ANC) 7.1 1.8 - 7.7 K/uL    Immature Grans (Abs) 0.06 (H) 0.00 - 0.04 K/uL    Lymph # 2.3 1.0 - 4.8 K/uL    Mono # 0.9 0.3 - 1.0 K/uL    Eos # 0.4 0.0 - 0.5 K/uL    Baso # 0.08 0.00 - 0.20 K/uL    nRBC 0 0 /100 WBC    Gran % 65.4 38.0 - 73.0 %    Lymph % 21.7 18.0 - 48.0 %    Mono % 8.3 4.0 - 15.0 %    Eosinophil % 3.3 0.0 - 8.0 %    Basophil % 0.7 0.0 - 1.9 %    Differential Method Automated    Comprehensive metabolic panel   Result Value Ref Range    Sodium 137 136 - 145 mmol/L    Potassium 3.8 3.5 - 5.1 mmol/L    Chloride 105 95 - 110 mmol/L    CO2 19 (L) 23 - 29 mmol/L    Glucose 213 (H) 70 - 110 mg/dL    BUN 11 6 - 20 mg/dL    Creatinine 0.7 0.5 - 1.4 mg/dL    Calcium 8.7 8.7 - 10.5 mg/dL    Total Protein 7.1 6.0 - 8.4 g/dL    Albumin 3.8 3.5 - 5.2 g/dL    Total Bilirubin 0.5 0.1 - 1.0 mg/dL    Alkaline Phosphatase 51 (L) 55 - 135 U/L    AST 15 10 - 40 U/L    ALT 18 10 - 44 U/L    eGFR >60 >60 mL/min/1.73 m^2    Anion Gap 13 8 - 16 mmol/L   Brain natriuretic peptide   Result Value Ref Range    BNP 14 0 - 99 pg/mL   Troponin I   Result Value Ref Range    Troponin I <0.006 0.000 - 0.026 ng/mL     Imaging Results:  Imaging Results              X-Ray Chest PA And Lateral (Preliminary result)  Result time 09/28/23 06:47:20      Wet Read by Peter Melgoza Jr., MD (09/28/23 06:47:20, [facility] - Emergency Dept., Emergency Medicine)    No acute findings                                     The EKG was ordered, reviewed, and independently interpreted by the ED provider.  Interpretation time: 05:13  Rate: 83 BPM  Rhythm: normal sinus  rhythm  Interpretation: Rightward axis. Abnormal QRS-T angle, consider primary T wave abnormality. No STEMI.           The Emergency Provider reviewed the vital signs and test results, which are outlined above.     ED Discussion     6:00 AM: Dr. Glass transfers care of patient to Dr. Melgoza pending lab and imaging results.    6:45 AM: Reassessed pt at this time. Discussed with pt all pertinent ED information and results. Discussed pt dx and plan of tx. Gave pt all f/u and return to the ED instructions. All questions and concerns were addressed at this time. Pt expresses understanding of information and instructions, and is comfortable with plan to discharge. Pt is stable for discharge.    I discussed with patient and/or family/caretaker that evaluation in the ED does not suggest any emergent or life threatening medical conditions requiring immediate intervention beyond what was provided in the ED, and I believe patient is safe for discharge.  Regardless, an unremarkable evaluation in the ED does not preclude the development or presence of a serious of life threatening condition. As such, patient was instructed to return immediately for any worsening or change in current symptoms.    ED Course as of 09/28/23 0651   u Sep 28, 2023   0647 Patient with bibasilar crackles on physical examination.  Chest x-ray is clear on my independent read however.  Clinically the patient has pneumonia will treat as such.  Vital signs are stable.  She is safe for discharge in my opinion. [RT]   0650 Cardiac monitor interpretation  Indication shortness of breath  Independent interpretation  Normal sinus rhythm rate of 72 no STEMI [RT]      ED Course User Index  [RT] Peter Melgoza Jr., MD     Medical Decision Making  Differential diagnosis: Pneumonia, CHF, viral syndrome, URI, bronchitis    Patient was evaluated history physical examination.  Patient has mild crackles in bilateral bases.  Initial sat was 92% but this improved to  100%.  Patient is diabetic limited knees steroids.  Will treat as pneumonia secondary to the crackles although her chest x-ray is clear.  Patient is in healthcare and verbalized agreement understanding with the plan of care safe for discharge in my opinion.    Amount and/or Complexity of Data Reviewed  Labs: ordered. Decision-making details documented in ED Course.     Details: Troponin less than 0.006 with a normal BNP.  Her CO2 is a bit down at 19 her sugar is up at 2:13 a.m. but is otherwise benign her CMP.  She is a normal white count but does have a mild bandemia.  Radiology: ordered and independent interpretation performed. Decision-making details documented in ED Course.     Details: Clear on my interpretation  ECG/medicine tests: ordered and independent interpretation performed. Decision-making details documented in ED Course.     Details: Normal sinus rhythm at 83.  No STEMI  Discussion of management or test interpretation with external provider(s): Port score class 1 low risk    Risk  OTC drugs.  Prescription drug management.  Decision regarding hospitalization.  Risk Details: Recommended Motrin Tylenol for fever and body aches and Sudafed for congestion.  These are over-the-counter.  Will prescribe Levaquin and albuterol.  Patient is low risk and does not require admission at this time.                ED Medication(s):  Medications   levoFLOXacin tablet 750 mg (has no administration in time range)   albuterol-ipratropium 2.5 mg-0.5 mg/3 mL nebulizer solution 3 mL (3 mLs Nebulization Given 9/28/23 0607)       New Prescriptions    ALBUTEROL (PROVENTIL/VENTOLIN HFA) 90 MCG/ACTUATION INHALER    Inhale 2 puffs into the lungs every 4 (four) hours as needed. Take 2 puffs of the lungs every 4 hr as needed for wheezing or shortness of breath    LEVOFLOXACIN (LEVAQUIN) 500 MG TABLET    Take 1 tablet (500 mg total) by mouth once daily. for 6 days        Follow-up Information       Sherri Kovacs FNP.    Specialty:  Family Medicine  Contact information:  [address] [place] [address]  [phone]                                 Scribe Attestation:   Scribe #1: I performed the above scribed service and the documentation accurately describes the services I performed. I attest to the accuracy of the note.     Attending:   Physician Attestation Statement for Scribe #1: I, Kenneth Glass MD, personally performed the services described in this documentation, as scribed by Leni Lopez, in my presence, and it is both accurate and complete.       Scribe Attestation:   Scribe #2: I performed the above scribed service and the documentation accurately describes the services I performed. I attest to the accuracy of the note.    Attending Attestation:           Physician Attestation for Scribe:    Physician Attestation Statement for Scribe #2: I, Peter Melgoza Jr., MD, reviewed documentation, as scribed by Afshin Daly in my presence, and it is both accurate and complete. I also acknowledge and confirm the content of the note done by Scribe #1.           Clinical Impression       ICD-10-CM ICD-9-CM   1. Pneumonia of both lower lobes due to infectious organism  J18.9 486   2. SOB (shortness of breath)  R06.02 786.05       Disposition:   Disposition: Discharged  Condition: Stable         Peter Melgoza Jr., MD  09/28/23 0650       Peter Melgoza Jr., MD  09/28/23 0651     Color consistent with ethnicity/race, warm, dry intact, resilient.

## 2023-12-21 ENCOUNTER — APPOINTMENT (OUTPATIENT)
Dept: ORTHOPEDIC SURGERY | Facility: CLINIC | Age: 78
End: 2023-12-21
Payer: MEDICARE

## 2023-12-21 PROCEDURE — 99214 OFFICE O/P EST MOD 30 MIN: CPT | Mod: 25

## 2024-01-01 NOTE — DISCHARGE NOTE ADULT - DISCHARGE TO
Goal Outcome Evaluation:           Progress: no change  Outcome Evaluation: VSS. X 1 episode. Cont meds, IVF and feeds as ordered. Parents updated on POC.                                Home

## 2024-01-16 ENCOUNTER — APPOINTMENT (OUTPATIENT)
Dept: ORTHOPEDIC SURGERY | Facility: CLINIC | Age: 79
End: 2024-01-16

## 2024-01-26 ENCOUNTER — APPOINTMENT (OUTPATIENT)
Dept: ORTHOPEDIC SURGERY | Facility: CLINIC | Age: 79
End: 2024-01-26

## 2024-03-17 ENCOUNTER — RX RENEWAL (OUTPATIENT)
Age: 79
End: 2024-03-17

## 2024-03-17 RX ORDER — OMEPRAZOLE 40 MG/1
40 CAPSULE, DELAYED RELEASE ORAL
Qty: 30 | Refills: 3 | Status: ACTIVE | COMMUNITY
Start: 2022-12-09 | End: 1900-01-01

## 2024-04-04 ENCOUNTER — APPOINTMENT (OUTPATIENT)
Dept: ORTHOPEDIC SURGERY | Facility: CLINIC | Age: 79
End: 2024-04-04
Payer: MEDICARE

## 2024-04-04 PROCEDURE — 64721 CARPAL TUNNEL SURGERY: CPT | Mod: LT

## 2024-04-15 ENCOUNTER — APPOINTMENT (OUTPATIENT)
Dept: ORTHOPEDIC SURGERY | Facility: CLINIC | Age: 79
End: 2024-04-15
Payer: MEDICARE

## 2024-04-15 DIAGNOSIS — G56.02 CARPAL TUNNEL SYNDROME, LEFT UPPER LIMB: ICD-10-CM

## 2024-04-15 PROCEDURE — 99024 POSTOP FOLLOW-UP VISIT: CPT

## 2024-04-19 ENCOUNTER — NON-APPOINTMENT (OUTPATIENT)
Age: 79
End: 2024-04-19

## 2024-05-02 NOTE — ED PROVIDER NOTE - PSH
Problem: At Risk for Falls  Goal: Patient does not fall  Outcome: Monitoring/Evaluating progress  Goal: Patient takes action to control fall-related risks  Outcome: Monitoring/Evaluating progress     Problem: Seizures  Goal: Seizure activity controlled  Outcome: Monitoring/Evaluating progress  Goal: Protected from injury if a seizure occurs  Outcome: Monitoring/Evaluating progress  Goal: Verbalizes understanding of seizures and treatment plan  Description: Document education using the patient education activity.   Outcome: Monitoring/Evaluating progress      H/O varicose vein stripping  20 yrs ago  S/P partial gastrectomy

## 2024-05-14 ENCOUNTER — APPOINTMENT (OUTPATIENT)
Dept: ORTHOPEDIC SURGERY | Facility: CLINIC | Age: 79
End: 2024-05-14
Payer: MEDICARE

## 2024-05-14 PROCEDURE — 64721 CARPAL TUNNEL SURGERY: CPT | Mod: 79,RT

## 2024-05-28 ENCOUNTER — APPOINTMENT (OUTPATIENT)
Dept: ORTHOPEDIC SURGERY | Facility: CLINIC | Age: 79
End: 2024-05-28
Payer: MEDICARE

## 2024-05-28 DIAGNOSIS — G56.01 CARPAL TUNNEL SYNDROME, RIGHT UPPER LIMB: ICD-10-CM

## 2024-05-28 PROCEDURE — 99024 POSTOP FOLLOW-UP VISIT: CPT

## 2024-06-08 ENCOUNTER — EMERGENCY (EMERGENCY)
Facility: HOSPITAL | Age: 79
LOS: 1 days | Discharge: HOME | End: 2024-06-08
Attending: EMERGENCY MEDICINE
Payer: COMMERCIAL

## 2024-06-08 VITALS
SYSTOLIC BLOOD PRESSURE: 145 MMHG | HEART RATE: 76 BPM | OXYGEN SATURATION: 97 % | RESPIRATION RATE: 20 BRPM | DIASTOLIC BLOOD PRESSURE: 70 MMHG | WEIGHT: 134.92 LBS | TEMPERATURE: 98 F

## 2024-06-08 VITALS
RESPIRATION RATE: 18 BRPM | DIASTOLIC BLOOD PRESSURE: 68 MMHG | SYSTOLIC BLOOD PRESSURE: 102 MMHG | OXYGEN SATURATION: 98 % | HEART RATE: 73 BPM

## 2024-06-08 DIAGNOSIS — Z98.89 OTHER SPECIFIED POSTPROCEDURAL STATES: Chronic | ICD-10-CM

## 2024-06-08 DIAGNOSIS — Z90.3 ACQUIRED ABSENCE OF STOMACH [PART OF]: Chronic | ICD-10-CM

## 2024-06-08 LAB
ALBUMIN SERPL ELPH-MCNC: 4.7 G/DL — SIGNIFICANT CHANGE UP (ref 3.3–5)
ALP SERPL-CCNC: 90 U/L — SIGNIFICANT CHANGE UP (ref 40–120)
ALT FLD-CCNC: 17 U/L — SIGNIFICANT CHANGE UP (ref 10–45)
ANION GAP SERPL CALC-SCNC: 13 MMOL/L — SIGNIFICANT CHANGE UP (ref 5–17)
APPEARANCE UR: CLEAR — SIGNIFICANT CHANGE UP
AST SERPL-CCNC: 17 U/L — SIGNIFICANT CHANGE UP (ref 10–40)
BILIRUB SERPL-MCNC: 1.4 MG/DL — HIGH (ref 0.2–1.2)
BILIRUB UR-MCNC: NEGATIVE — SIGNIFICANT CHANGE UP
BUN SERPL-MCNC: 8 MG/DL — SIGNIFICANT CHANGE UP (ref 7–23)
CALCIUM SERPL-MCNC: 9.7 MG/DL — SIGNIFICANT CHANGE UP (ref 8.4–10.5)
CHLORIDE SERPL-SCNC: 105 MMOL/L — SIGNIFICANT CHANGE UP (ref 96–108)
CO2 SERPL-SCNC: 23 MMOL/L — SIGNIFICANT CHANGE UP (ref 22–31)
COLOR SPEC: YELLOW — SIGNIFICANT CHANGE UP
CREAT SERPL-MCNC: 0.71 MG/DL — SIGNIFICANT CHANGE UP (ref 0.5–1.3)
DIFF PNL FLD: NEGATIVE — SIGNIFICANT CHANGE UP
EGFR: 86 ML/MIN/1.73M2 — SIGNIFICANT CHANGE UP
GLUCOSE SERPL-MCNC: 111 MG/DL — HIGH (ref 70–99)
GLUCOSE UR QL: NEGATIVE MG/DL — SIGNIFICANT CHANGE UP
HCT VFR BLD CALC: 36.6 % — SIGNIFICANT CHANGE UP (ref 34.5–45)
HGB BLD-MCNC: 11.9 G/DL — SIGNIFICANT CHANGE UP (ref 11.5–15.5)
KETONES UR-MCNC: NEGATIVE MG/DL — SIGNIFICANT CHANGE UP
LEUKOCYTE ESTERASE UR-ACNC: NEGATIVE — SIGNIFICANT CHANGE UP
MCHC RBC-ENTMCNC: 27.4 PG — SIGNIFICANT CHANGE UP (ref 27–34)
MCHC RBC-ENTMCNC: 32.5 GM/DL — SIGNIFICANT CHANGE UP (ref 32–36)
MCV RBC AUTO: 84.3 FL — SIGNIFICANT CHANGE UP (ref 80–100)
NITRITE UR-MCNC: NEGATIVE — SIGNIFICANT CHANGE UP
NRBC # BLD: 0 /100 WBCS — SIGNIFICANT CHANGE UP (ref 0–0)
PH UR: 7.5 — SIGNIFICANT CHANGE UP (ref 5–8)
PLATELET # BLD AUTO: 276 K/UL — SIGNIFICANT CHANGE UP (ref 150–400)
POTASSIUM SERPL-MCNC: 3.8 MMOL/L — SIGNIFICANT CHANGE UP (ref 3.5–5.3)
POTASSIUM SERPL-SCNC: 3.8 MMOL/L — SIGNIFICANT CHANGE UP (ref 3.5–5.3)
PROT SERPL-MCNC: 7.3 G/DL — SIGNIFICANT CHANGE UP (ref 6–8.3)
PROT UR-MCNC: NEGATIVE MG/DL — SIGNIFICANT CHANGE UP
RBC # BLD: 4.34 M/UL — SIGNIFICANT CHANGE UP (ref 3.8–5.2)
RBC # FLD: 14 % — SIGNIFICANT CHANGE UP (ref 10.3–14.5)
SODIUM SERPL-SCNC: 141 MMOL/L — SIGNIFICANT CHANGE UP (ref 135–145)
SP GR SPEC: 1.01 — SIGNIFICANT CHANGE UP (ref 1–1.03)
UROBILINOGEN FLD QL: 0.2 MG/DL — SIGNIFICANT CHANGE UP (ref 0.2–1)
WBC # BLD: 7.91 K/UL — SIGNIFICANT CHANGE UP (ref 3.8–10.5)
WBC # FLD AUTO: 7.91 K/UL — SIGNIFICANT CHANGE UP (ref 3.8–10.5)

## 2024-06-08 PROCEDURE — 93005 ELECTROCARDIOGRAM TRACING: CPT

## 2024-06-08 PROCEDURE — 81003 URINALYSIS AUTO W/O SCOPE: CPT

## 2024-06-08 PROCEDURE — 70496 CT ANGIOGRAPHY HEAD: CPT | Mod: MC

## 2024-06-08 PROCEDURE — 80053 COMPREHEN METABOLIC PANEL: CPT

## 2024-06-08 PROCEDURE — 36000 PLACE NEEDLE IN VEIN: CPT | Mod: XU

## 2024-06-08 PROCEDURE — 99285 EMERGENCY DEPT VISIT HI MDM: CPT

## 2024-06-08 PROCEDURE — 70498 CT ANGIOGRAPHY NECK: CPT | Mod: 26,MC

## 2024-06-08 PROCEDURE — 70450 CT HEAD/BRAIN W/O DYE: CPT | Mod: MC

## 2024-06-08 PROCEDURE — 87086 URINE CULTURE/COLONY COUNT: CPT

## 2024-06-08 PROCEDURE — 70498 CT ANGIOGRAPHY NECK: CPT | Mod: MC

## 2024-06-08 PROCEDURE — 99285 EMERGENCY DEPT VISIT HI MDM: CPT | Mod: 25

## 2024-06-08 PROCEDURE — 70450 CT HEAD/BRAIN W/O DYE: CPT | Mod: 26,XU,MC

## 2024-06-08 PROCEDURE — 85027 COMPLETE CBC AUTOMATED: CPT

## 2024-06-08 PROCEDURE — 70496 CT ANGIOGRAPHY HEAD: CPT | Mod: 26,MC

## 2024-06-08 RX ORDER — MECLIZINE HCL 12.5 MG
1 TABLET ORAL
Qty: 9 | Refills: 0
Start: 2024-06-08 | End: 2024-06-10

## 2024-06-08 RX ORDER — MECLIZINE HCL 12.5 MG
25 TABLET ORAL ONCE
Refills: 0 | Status: COMPLETED | OUTPATIENT
Start: 2024-06-08 | End: 2024-06-08

## 2024-06-08 RX ADMIN — Medication 25 MILLIGRAM(S): at 13:20

## 2024-06-08 NOTE — ED PROVIDER NOTE - NSFOLLOWUPCLINICS_GEN_ALL_ED_FT
[facility]  Neurology  [address] [place] [address] [address] [place] [address]  Phone: [phone]  Fax:

## 2024-06-08 NOTE — ED ADULT NURSE NOTE - NSFALLLASTSIX_ED_ALL_ED
Group Topic: BH Education    Date: 11/8/2019  Start Time: 10:15 AM  End Time: 11:00 AM    Focus: Weekend Planning  Number in attendance: 7    Patient participated in Weekend Planning group, shared ideas of healthy and unhealthy weekend activities, completed worksheet on weekend structure and goal setting, shared with the group.     Method: Group  Attendance: Present  Participation: Active  Patient Response: Appropriate feedback  Mood: Anxious  Affect: Calm  Behavior/Socialization: Appropriate to group  Thought Process: Focused  Task Performance: Follows directions   No.

## 2024-06-08 NOTE — ED PROVIDER NOTE - PROGRESS NOTE DETAILS
Hemant Loya MD PGY2: pt feels improved, ambulates well. labs, urine nonactionable. ct with spine/calvarium lucencies but unchanged from prior, no acute changes. pt has previously seen an oncologist in 2021 for findings and had negative malignancy workup. dw pt results, dc, follow up, return precautions. understands and is amenable at this time.

## 2024-06-08 NOTE — ED PROVIDER NOTE - OBJECTIVE STATEMENT
Quiana Jarvis MD  82-year-old female with past medical history of hypertension, she was previously on losartan 100 mg/day and was changed to olmesartan and hydrochlorothiazide 20 mg/12.5 mg about 3 months ago.  More recently she is feeling dizzy like she is on the boat.  She also reports increased urination since  she was started on this new medication.  No history of fever, no chills, no urinary symptoms.

## 2024-06-08 NOTE — ED ADULT TRIAGE NOTE - NS ED NURSE BANDS TYPE
From: Sara Orozco  To: Abena Christianson MD  Sent: 7/21/2020 9:16 PM CDT  Subject: Medication Question    Hi!   I am in a panic. I lost my last batch of birth control pills that I picked up. I just moved and I don't know what happened to it during the move but I just cannot find it. I need it ASAP! I was supposed to start a new pack as of tomorrow. So sorry for this nonsense!   Thank you!   Shilpi   Name band;

## 2024-06-08 NOTE — ED PROVIDER NOTE - ATTENDING CONTRIBUTION TO CARE
I performed a history and physical exam of the patient and discussed their management with the resident. I reviewed the resident's note and agree with the documented findings and plan of care.  Quiana Jarvis MD

## 2024-06-08 NOTE — ED PROVIDER NOTE - NSFOLLOWUPINSTRUCTIONS_ED_ALL_ED_FT
You were seen in the ED for dizziness    Your evaluation including CT and blood tests and urine test showed no findings requiring further evaluation or treatment in the hospital at this time.    Please follow up with your PCP as discussed within 1 week.  You may also follow-up with neurology for your dizziness.  You were prescribed a medication called meclizine for dizziness, you may take up to 1 tablet every 8 hours as needed for dizziness.    Seek immediate medical attention if you experience new or worsening symptoms, suddenly or severely worsening headache or symptoms, inability to walk, vision changes, weakness or numbness one-sided the body.

## 2024-06-08 NOTE — ED PROVIDER NOTE - CLINICAL SUMMARY MEDICAL DECISION MAKING FREE TEXT BOX
Quiana Jarvis MD  82-year-old female with past medical history of hypertension, she was previously on losartan 100 mg/day and was changed to olmesartan and hydrochlorothiazide 20 mg/12.5 mg about 3 months ago.  More recently she is feeling dizzy like she is on the boat.  She also reports increased urination since  she was started on this new medication.  No history of fever, no chills, no urinary symptoms. on exam, no pedal edema, neurologic intact, will r/o vertebrobasilar versus  peripheral vertigo, increased urination most likely from HCTZ but will r/o UTI; PLan: meclizine, labs, CT brain NC and head and neck angio, reassess.

## 2024-06-08 NOTE — ED ADULT NURSE NOTE - OBJECTIVE STATEMENT
pt 80 yo female presented to er c/o feeling head lightedheaed foggy and some dizziness since her medications were adjusted approx 3 mos ago by cardiologist Dr Marc Esquivel per  when Dr Esquivel called and told of symptoms md suggested pt come to ER for eval pt ambulatory with steady gait denies any visual changes motor sensory intact to extremities

## 2024-06-08 NOTE — ED ADULT NURSE NOTE - NSFALLUNIVINTERV_ED_ALL_ED
Bed/Stretcher in lowest position, wheels locked, appropriate side rails in place/Call bell, personal items and telephone in reach/Instruct patient to call for assistance before getting out of bed/chair/stretcher/Non-slip footwear applied when patient is off stretcher/[place] to call system/Physically safe environment - no spills, clutter or unnecessary equipment/Purposeful proactive rounding/Room/bathroom lighting operational, light cord in reach

## 2024-06-08 NOTE — ED PROVIDER NOTE - PATIENT PORTAL LINK FT
You can access the FollowMyHealth Patient Portal offered by [facility] by registering at the following website: [email or web address]. By joining [facility] FollowMyHealth portal, you will also be able to view your health information using other applications (apps) compatible with our system.

## 2024-06-10 ENCOUNTER — APPOINTMENT (OUTPATIENT)
Dept: PAIN MANAGEMENT | Facility: CLINIC | Age: 79
End: 2024-06-10

## 2024-06-10 LAB
CULTURE RESULTS: SIGNIFICANT CHANGE UP
SPECIMEN SOURCE: SIGNIFICANT CHANGE UP

## 2024-07-16 ENCOUNTER — OUTPATIENT (OUTPATIENT)
Dept: OUTPATIENT SERVICES | Facility: HOSPITAL | Age: 79
LOS: 1 days | End: 2024-07-16
Payer: COMMERCIAL

## 2024-07-16 ENCOUNTER — APPOINTMENT (OUTPATIENT)
Dept: NEUROLOGY | Facility: HOSPITAL | Age: 79
End: 2024-07-16

## 2024-07-16 DIAGNOSIS — R51.9 HEADACHE, UNSPECIFIED: ICD-10-CM

## 2024-07-16 DIAGNOSIS — Z98.89 OTHER SPECIFIED POSTPROCEDURAL STATES: Chronic | ICD-10-CM

## 2024-07-16 DIAGNOSIS — R42 DIZZINESS AND GIDDINESS: ICD-10-CM

## 2024-07-16 DIAGNOSIS — Z90.3 ACQUIRED ABSENCE OF STOMACH [PART OF]: Chronic | ICD-10-CM

## 2024-07-16 PROCEDURE — G0463: CPT

## 2024-10-08 ENCOUNTER — APPOINTMENT (OUTPATIENT)
Dept: NEUROLOGY | Facility: HOSPITAL | Age: 79
End: 2024-10-08

## 2024-10-08 ENCOUNTER — APPOINTMENT (OUTPATIENT)
Dept: NEUROLOGY | Facility: CLINIC | Age: 79
End: 2024-10-08

## 2024-10-10 ENCOUNTER — APPOINTMENT (OUTPATIENT)
Dept: UROLOGY | Facility: CLINIC | Age: 79
End: 2024-10-10

## 2025-02-18 NOTE — PRE-OP CHECKLIST - PATIENT PROBLEMS/NEEDS
"Daily Note     Today's date: 2025  Patient name: Lori Byrd  : 1942  MRN: [id number]  Referring provider: Peter Aceves DO  Dx:   Encounter Diagnosis     ICD-10-CM    1. Primary osteoarthritis of left knee  M17.12       2. Left knee pain, unspecified chronicity  M25.562                      Subjective: Pt reports that she may have to ramon her session a little short today to see her  before he leaves for his trip. She notes that for some reason, her left knee has been really painful. She would like to decrease the level on nustep today.       Objective: See treatment diary below      Assessment: Tolerated treatment well. Verbal cues provided for several exercises today to improve concentric muscle contraction to to control movements. VC provided for proper quadset and to maintain this with SLR. Pt noted increased difficulty with this one. She reported the knee felt \"okay\" post exercise. Not all exercises performed today due to patient needing to leave early. Re-implement all exercises NV as able. Patient would benefit from continued PT      Plan: Progress treatment as tolerated.         Precautions:  Hx Breast Cancer- In Remission       Manuals 25 2-7-25 2-10-25 2-14-25 2-18-25   Gentle PROM/ Stretch LLE 10' 8' held                             Neuro Re-Ed         Quad set 20x  5\" 20x 5\"  20x  5\" 20x  5\" 20x5\"   Add sq 20x  5\" 20x 5\"  20x  5\" 20x  5\"    Side stepping at rail Green band 10x R/L at rail Green band 10x R/L at rail Green band 10x R/L at rail Blue band 10x R/L at rail Blue band 10x R/L at rail   Tandem stance                                Ther Ex        Nustep L5  12' L5 12' L5  12' L5  12' Lv4 12'   TR/HR 2x10 2x10 ea 2x10 20x 20x   Mini squat 2x10 1x10 2x10 2x10 2x10 with VC   LAQ 1.5#  2x10 1.5# 2x10 1.5#  2x10 1.5#  2x15 2# 2x15 with cues to avoid swinging and use more muscular control   T-band HS curl Green 2x10 Gree 2x10 Green 2x10 Green 2x15    SAQ  2x10 0#  2x10 1.5# " 2x10    SLR 10x 10x 2x10 15x 2x5 with VC for quadset and proper mechanics   St HS Curl        Supine clam shell  Green 2x10 Green 2x10 Green 2x10    St hip 3 way 1.5#  2x10 ea BLE 1.5#  2x10 ea BLE 2x10 each March, abd, ext 1.5#  20x ea March, abd, ext 1.5#  20x ea                           HEP   Instructed and issued HEP  ([id number])     Ther Activity                        Gait Training                        Modalities        CP  MHP 10'  left knee  MHP 10'  left knee                                        Patient expressed no known problems or needs

## 2025-05-27 NOTE — DISCHARGE NOTE ADULT - MEDICATION SUMMARY - MEDICATIONS TO STOP TAKING
[Not Examined] : not examined [Normal] : The patient is moving all extremities spontaneously without any gross neurologic deficits. They walk with a fluid nonantalgic gait. There are equal and symmetric deep tendon reflexes in the upper and lower extremities bilaterally. There is gross intact sensation to soft and light touch in the bilateral upper and lower extremities [Musculoskeletal All Normal] : normal gait for age, good posture, normal clinical alignment in upper and lower extremities, normal clinical alignment of the spine, full range of motion in bilateral upper and lower extremities [de-identified] : islt  intact motor I will STOP taking the medications listed below when I get home from the hospital:  None

## 2025-06-19 NOTE — ED ADULT NURSE NOTE - NS ED NURSE DISCH DISPOSITION
Detailed VM left for the patient regarding her recent surgery. I informed the patient that she has a post op appointment scheduled for 6/30 and to call the office back if this day and time does not work for her.  
Discharged

## (undated) DEVICE — DRSG STERISTRIPS 0.5X4"

## (undated) DEVICE — TROCAR COVIDIEN VERSAONE BLADED SMOOTH 5MM

## (undated) DEVICE — DRAIN JACKSON PRATT 10MM FLAT 3/4 NO TROCAR

## (undated) DEVICE — TUBING STRYKEFLOW II SUCTION / IRRIGATOR

## (undated) DEVICE — ENDOCATCH 10MM SPECIMEN POUCH

## (undated) DEVICE — DRAIN RESERVOIR FOR JACKSON PRATT 100CC CARDINAL

## (undated) DEVICE — SYR ASEPTO

## (undated) DEVICE — GLV 7.5 PROTEXIS

## (undated) DEVICE — NDL INSUFFLATION SURGINEEDLE 120MM

## (undated) DEVICE — SUT POLYSORB 0 30" GU-46

## (undated) DEVICE — BLANKET WARMER UPPER ADULT

## (undated) DEVICE — SET BASIN SINGLE STERILE

## (undated) DEVICE — DRAPE C ARM UNIVERSAL

## (undated) DEVICE — SYR LUER LOK 10CC

## (undated) DEVICE — FOR-ESU VALLEYLAB T7E14999DX: Type: DURABLE MEDICAL EQUIPMENT

## (undated) DEVICE — STAPLER SKIN VISI-STAT 35 WIDE

## (undated) DEVICE — TUBING TRUWAVE PRESSURE MALE/FEMALE 72"

## (undated) DEVICE — DRSG BANDAID 0.75X3"

## (undated) DEVICE — DRSG MEDIPORE DRESS IT 5-7/8 X 11"

## (undated) DEVICE — DRSG 4X4

## (undated) DEVICE — DRSG MASTISOL

## (undated) DEVICE — GLV 7 PROTEXIS

## (undated) DEVICE — SUT MAXON 1 60" T-60

## (undated) DEVICE — D HELP - CLEARVIEW CLEARIFY SYSTEM

## (undated) DEVICE — SOL IRR POUR NS 0.9% 1500ML

## (undated) DEVICE — WRAP COMPRESSION CALF MED

## (undated) DEVICE — PACK GENERAL LAPAROSCOPY

## (undated) DEVICE — ELCTR BOVIE BLADE 3/4" EXTENDED LENGTH 6"

## (undated) DEVICE — SOL INJ NS 0.9% 1000ML

## (undated) DEVICE — ELCTR GROUNDING PAD ADULT COVIDIEN

## (undated) DEVICE — TROCAR COVIDIEN VERSAONE BLADED 11MM STD

## (undated) DEVICE — SUCTION YANKAUER NO CONTROL VENT

## (undated) DEVICE — PRECISION CUT SCISSOR MICROLINE MINI ENDOCUT DISP

## (undated) DEVICE — BLADE SURGICAL #10 CARBON

## (undated) DEVICE — TUBING STRYKER PNEUMOSURE HEATED RTP

## (undated) DEVICE — BLADE SURGICAL #15 CARBON

## (undated) DEVICE — SUT POLYSORB 4-0 27" P-12 UNDYED

## (undated) DEVICE — DRAPE LIGHT HANDLE COVER BLUE

## (undated) DEVICE — ELCTR FOOT CONTROL L WIRE LAPAROSCOPIC

## (undated) DEVICE — NDL HYPO REGULAR BEVEL 25G X 1.5"